# Patient Record
Sex: FEMALE | Race: WHITE | Employment: FULL TIME | URBAN - METROPOLITAN AREA
[De-identification: names, ages, dates, MRNs, and addresses within clinical notes are randomized per-mention and may not be internally consistent; named-entity substitution may affect disease eponyms.]

---

## 2017-11-02 ENCOUNTER — HOSPITAL ENCOUNTER (INPATIENT)
Facility: HOSPITAL | Age: 62
LOS: 2 days | Discharge: HOME | DRG: 293 | End: 2017-11-04
Attending: EMERGENCY MEDICINE | Admitting: FAMILY MEDICINE
Payer: COMMERCIAL

## 2017-11-02 ENCOUNTER — APPOINTMENT (OUTPATIENT)
Dept: GENERAL RADIOLOGY | Facility: HOSPITAL | Age: 62
DRG: 293 | End: 2017-11-02
Attending: EMERGENCY MEDICINE
Payer: COMMERCIAL

## 2017-11-02 DIAGNOSIS — R07.89 OTHER CHEST PAIN: ICD-10-CM

## 2017-11-02 DIAGNOSIS — I10 HYPERTENSION, UNSPECIFIED TYPE: ICD-10-CM

## 2017-11-02 DIAGNOSIS — R73.9 HYPERGLYCEMIA: Primary | ICD-10-CM

## 2017-11-02 DIAGNOSIS — E11.8 TYPE 2 DIABETES MELLITUS WITH COMPLICATION, WITHOUT LONG-TERM CURRENT USE OF INSULIN (HCC): ICD-10-CM

## 2017-11-02 DIAGNOSIS — R60.9 PERIPHERAL EDEMA: ICD-10-CM

## 2017-11-02 PROBLEM — R60.0 PERIPHERAL EDEMA: Status: ACTIVE | Noted: 2017-11-02

## 2017-11-02 PROBLEM — I50.9 CHF EXACERBATION (HCC): Status: ACTIVE | Noted: 2017-11-02

## 2017-11-02 PROCEDURE — 84484 ASSAY OF TROPONIN QUANT: CPT | Performed by: EMERGENCY MEDICINE

## 2017-11-02 PROCEDURE — 82962 GLUCOSE BLOOD TEST: CPT

## 2017-11-02 PROCEDURE — 85025 COMPLETE CBC W/AUTO DIFF WBC: CPT | Performed by: EMERGENCY MEDICINE

## 2017-11-02 PROCEDURE — 99285 EMERGENCY DEPT VISIT HI MDM: CPT

## 2017-11-02 PROCEDURE — 93005 ELECTROCARDIOGRAM TRACING: CPT

## 2017-11-02 PROCEDURE — 96372 THER/PROPH/DIAG INJ SC/IM: CPT

## 2017-11-02 PROCEDURE — 71020 XR CHEST PA + LAT CHEST (CPT=71020): CPT | Performed by: EMERGENCY MEDICINE

## 2017-11-02 PROCEDURE — 80053 COMPREHEN METABOLIC PANEL: CPT | Performed by: EMERGENCY MEDICINE

## 2017-11-02 PROCEDURE — 83880 ASSAY OF NATRIURETIC PEPTIDE: CPT | Performed by: EMERGENCY MEDICINE

## 2017-11-02 PROCEDURE — 93010 ELECTROCARDIOGRAM REPORT: CPT

## 2017-11-02 RX ORDER — ACYCLOVIR 400 MG/1
400 TABLET ORAL 4 TIMES DAILY
COMMUNITY
Start: 2017-10-28 | End: 2017-11-04

## 2017-11-02 RX ORDER — LOSARTAN POTASSIUM 50 MG/1
50 TABLET ORAL DAILY
Status: DISCONTINUED | OUTPATIENT
Start: 2017-11-02 | End: 2017-11-03

## 2017-11-02 RX ORDER — METOPROLOL SUCCINATE 50 MG/1
50 TABLET, EXTENDED RELEASE ORAL
Status: DISCONTINUED | OUTPATIENT
Start: 2017-11-03 | End: 2017-11-03

## 2017-11-02 RX ORDER — INSULIN ASPART 100 [IU]/ML
0.15 INJECTION, SOLUTION INTRAVENOUS; SUBCUTANEOUS ONCE
Status: COMPLETED | OUTPATIENT
Start: 2017-11-02 | End: 2017-11-02

## 2017-11-02 RX ORDER — ZOLPIDEM TARTRATE 5 MG/1
5 TABLET ORAL NIGHTLY PRN
Status: DISCONTINUED | OUTPATIENT
Start: 2017-11-02 | End: 2017-11-04

## 2017-11-02 RX ORDER — METOPROLOL SUCCINATE 25 MG/1
25 TABLET, EXTENDED RELEASE ORAL
Status: DISCONTINUED | OUTPATIENT
Start: 2017-11-03 | End: 2017-11-02

## 2017-11-02 RX ORDER — METOPROLOL SUCCINATE 50 MG/1
50 TABLET, EXTENDED RELEASE ORAL DAILY
COMMUNITY
End: 2017-11-04

## 2017-11-02 RX ORDER — DEXTROSE MONOHYDRATE 25 G/50ML
50 INJECTION, SOLUTION INTRAVENOUS
Status: DISCONTINUED | OUTPATIENT
Start: 2017-11-02 | End: 2017-11-04

## 2017-11-02 RX ORDER — ENOXAPARIN SODIUM 100 MG/ML
40 INJECTION SUBCUTANEOUS DAILY
Status: DISCONTINUED | OUTPATIENT
Start: 2017-11-02 | End: 2017-11-04

## 2017-11-02 NOTE — ED PROVIDER NOTES
Patient Seen in: [facility] Emergency Department    History   Patient presents with:  Hypertension (cardiovascular)    Stated Complaint: htn    HPI    61-year-old female sent to the emergency room by her primary care physician for abnormal labs and Smokeless tobacco: Never Used                      Alcohol use: Yes              Comment: rare      Review of Systems    Positive for stated complaint: htn  Other systems are as noted in HPI.   Constitut Monocyte Absolute 0.61 (*)     All other components within normal limits   TROPONIN I - Normal   CBC WITH DIFFERENTIAL WITH PLATELET    Narrative: The following orders were created for panel order CBC WITH DIFFERENTIAL WITH PLATELET.   Procedure patient is to be admitted for further evaluation of her malignant hypertension, abnormal BNP, peripheral edema. Cardiology consulted. Patient agrees with plan for admission. Glucose elevated at 348 but there is no evidence of DKA. Insulin ordered.   Gissel Melo

## 2017-11-02 NOTE — ED NOTES
Pt is resting abed at this time, no distress noted, skin w/p/d, hypertensive. Updated on POC. Call light remains in reach. Will continue to monitor.

## 2017-11-02 NOTE — ED NOTES
Pt reports previous hx of hypertension - was on lisinopril/hctz. Reports now on beta blocker.      Pt reports blurry vision - states \"I feel like I should have my other glasses on\" - states symptom comes and goes, but does not feel like she has blurry vis

## 2017-11-02 NOTE — ED INITIAL ASSESSMENT (HPI)
seen yesterday for 255/110 bp, similar in office, gave labetolol down to 190's pt wanting to go home, labs were drawn, bnp was 1287, bp still elevated pt called at work to come to er, nonfasting glusose 397, chol 349

## 2017-11-03 ENCOUNTER — APPOINTMENT (OUTPATIENT)
Dept: CV DIAGNOSTICS | Facility: HOSPITAL | Age: 62
DRG: 293 | End: 2017-11-03
Attending: FAMILY MEDICINE
Payer: COMMERCIAL

## 2017-11-03 ENCOUNTER — PRIOR ORIGINAL RECORDS (OUTPATIENT)
Dept: OTHER | Age: 62
End: 2017-11-03

## 2017-11-03 PROCEDURE — 83036 HEMOGLOBIN GLYCOSYLATED A1C: CPT | Performed by: FAMILY MEDICINE

## 2017-11-03 PROCEDURE — 78452 HT MUSCLE IMAGE SPECT MULT: CPT | Performed by: FAMILY MEDICINE

## 2017-11-03 PROCEDURE — 93018 CV STRESS TEST I&R ONLY: CPT | Performed by: FAMILY MEDICINE

## 2017-11-03 PROCEDURE — 93306 TTE W/DOPPLER COMPLETE: CPT | Performed by: FAMILY MEDICINE

## 2017-11-03 PROCEDURE — 82962 GLUCOSE BLOOD TEST: CPT

## 2017-11-03 PROCEDURE — 80061 LIPID PANEL: CPT | Performed by: FAMILY MEDICINE

## 2017-11-03 PROCEDURE — 80053 COMPREHEN METABOLIC PANEL: CPT | Performed by: FAMILY MEDICINE

## 2017-11-03 PROCEDURE — 85025 COMPLETE CBC W/AUTO DIFF WBC: CPT | Performed by: FAMILY MEDICINE

## 2017-11-03 PROCEDURE — 93017 CV STRESS TEST TRACING ONLY: CPT | Performed by: FAMILY MEDICINE

## 2017-11-03 RX ORDER — ASPIRIN 325 MG
325 TABLET, DELAYED RELEASE (ENTERIC COATED) ORAL DAILY
Status: DISCONTINUED | OUTPATIENT
Start: 2017-11-03 | End: 2017-11-03

## 2017-11-03 RX ORDER — CARVEDILOL 3.12 MG/1
6.25 TABLET ORAL 2 TIMES DAILY WITH MEALS
Qty: 60 TABLET | Refills: 11 | Status: SHIPPED | OUTPATIENT
Start: 2017-11-03 | End: 2018-12-12 | Stop reason: DRUGHIGH

## 2017-11-03 RX ORDER — LISINOPRIL 20 MG/1
20 TABLET ORAL DAILY
Qty: 30 TABLET | Refills: 11 | Status: SHIPPED | OUTPATIENT
Start: 2017-11-03 | End: 2018-12-12 | Stop reason: DRUGHIGH

## 2017-11-03 RX ORDER — POTASSIUM CHLORIDE 20 MEQ/1
40 TABLET, EXTENDED RELEASE ORAL ONCE
Status: COMPLETED | OUTPATIENT
Start: 2017-11-03 | End: 2017-11-03

## 2017-11-03 RX ORDER — METOPROLOL TARTRATE 50 MG/1
TABLET, FILM COATED ORAL
Qty: 2 TABLET | Refills: 0 | Status: SHIPPED | OUTPATIENT
Start: 2017-11-03 | End: 2017-11-03

## 2017-11-03 RX ORDER — AMLODIPINE BESYLATE 5 MG/1
5 TABLET ORAL DAILY
Qty: 30 TABLET | Refills: 11 | Status: SHIPPED | OUTPATIENT
Start: 2017-11-04 | End: 2018-12-12 | Stop reason: ALTCHOICE

## 2017-11-03 RX ORDER — ATORVASTATIN CALCIUM 10 MG/1
10 TABLET, FILM COATED ORAL NIGHTLY
Qty: 30 TABLET | Refills: 11 | Status: SHIPPED | OUTPATIENT
Start: 2017-11-03 | End: 2019-12-18

## 2017-11-03 RX ORDER — ATORVASTATIN CALCIUM 10 MG/1
10 TABLET, FILM COATED ORAL NIGHTLY
Status: DISCONTINUED | OUTPATIENT
Start: 2017-11-03 | End: 2017-11-04

## 2017-11-03 RX ORDER — AMLODIPINE BESYLATE 5 MG/1
5 TABLET ORAL DAILY
Status: DISCONTINUED | OUTPATIENT
Start: 2017-11-03 | End: 2017-11-04

## 2017-11-03 RX ORDER — LISINOPRIL 20 MG/1
20 TABLET ORAL DAILY
Status: DISCONTINUED | OUTPATIENT
Start: 2017-11-03 | End: 2017-11-04

## 2017-11-03 RX ORDER — METOPROLOL TARTRATE 50 MG/1
50 TABLET, FILM COATED ORAL AS NEEDED
Status: DISCONTINUED | OUTPATIENT
Start: 2017-11-03 | End: 2017-11-04

## 2017-11-03 RX ORDER — CARVEDILOL 3.12 MG/1
3.12 TABLET ORAL 2 TIMES DAILY WITH MEALS
Status: DISCONTINUED | OUTPATIENT
Start: 2017-11-03 | End: 2017-11-04

## 2017-11-03 RX ORDER — TRIAMTERENE AND HYDROCHLOROTHIAZIDE 37.5; 25 MG/1; MG/1
1 CAPSULE ORAL DAILY
Status: DISCONTINUED | OUTPATIENT
Start: 2017-11-03 | End: 2017-11-03

## 2017-11-03 RX ORDER — ASPIRIN 81 MG/1
81 TABLET ORAL DAILY
Qty: 30 TABLET | Refills: 11 | Status: SHIPPED | OUTPATIENT
Start: 2017-11-03 | End: 2019-06-17 | Stop reason: ALTCHOICE

## 2017-11-03 RX ORDER — ASPIRIN 81 MG/1
81 TABLET ORAL DAILY
Status: DISCONTINUED | OUTPATIENT
Start: 2017-11-03 | End: 2017-11-04

## 2017-11-03 NOTE — CONSULTS
Cardiology Consultation    Artur Rinaldi Patient Status:  Observation    8/10/1955 MRN [id number]   [facility] 7NE-A Attending Darcy Bell MD   Hosp Day # 0 PCP Deepak Drew MD     Reason for Consultation:  Hypertensive urgency (1.626 m)   Wt 155 lb (70.3 kg)   SpO2 100%   BMI 26.61 kg/m²   Temp (24hrs), Av.5 °F (36.9 °C), Min:98.4 °F (36.9 °C), Max:98.6 °F (37 °C)     No intake or output data in the 24 hours ending 17 2212  Wt Readings from Last 3 Encounters:  17

## 2017-11-03 NOTE — PROGRESS NOTES
[facility] Cardiology Progress Note    Patient seen and examined.  Chart reviewed.  Discussed with family at bedside. No chest pain or shortness of breath.     BP (!) 172/77 (BP Location: Right arm)   Pulse 71   Temp 98.6 °F (37 °C) (Oral)   Resp 16   Ht 5' 4\"
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
## 2017-11-03 NOTE — PROGRESS NOTES
Cardiac Diagnostics preliminary Lexiscan nuclear stress test:  Patient denied cardiac symptoms, and no ST changes noted on EKG throughout test.  Patient resting in recliner awaiting stress images and denies need of anything.     Addendum:    Nuclear images

## 2017-11-03 NOTE — H&P
[address] Patient Status:  Inpatient    8/10/1955 MRN [id number]   [facility] 7NE-A Attending Oneida Ulrich MD   Hosp Day # 1 PCP Fantasma Soni MD     History of Present Illness:  Miki Knutson Other      age at dx 43's      reports that she has never smoked. She has never used smokeless tobacco. She reports that she drinks alcohol. She reports that she does not use drugs.     Allergies:    Codeine [Opioid Sue*    [facility] stool   Extremities:   Extremities normal, atraumatic, no cyanosis or edema   Pulses:   2+ and symmetric all extremities   Skin:   Skin color, texture, turgor normal, no rashes or lesions   Lymph nodes:   Cervical, supraclavicular, and axillary nodes teresa

## 2017-11-03 NOTE — PLAN OF CARE
Assumed care at 2030. Alert and oriented x4. Telemetry monitor reading SR. Up ad anjelica. RADHA hose at bedside, patient will put on in the am. Patient refused Lovenox earlier in shift, but stated later that she would start taking the next dose offered.  Assessme

## 2017-11-03 NOTE — PLAN OF CARE
Received patient via cart from the ED with no c/o chest pain or SOB. Alert and oriented x4. Telemetry monitor reading SR. Oriented to floor and hospital policies. See admission assessment for complete charting.  Call light in reach at the bedside and bed in

## 2017-11-04 VITALS
WEIGHT: 176.81 LBS | DIASTOLIC BLOOD PRESSURE: 74 MMHG | OXYGEN SATURATION: 99 % | HEIGHT: 64 IN | RESPIRATION RATE: 18 BRPM | TEMPERATURE: 98 F | BODY MASS INDEX: 30.19 KG/M2 | SYSTOLIC BLOOD PRESSURE: 121 MMHG | HEART RATE: 74 BPM

## 2017-11-04 PROCEDURE — 84132 ASSAY OF SERUM POTASSIUM: CPT | Performed by: FAMILY MEDICINE

## 2017-11-04 PROCEDURE — 82962 GLUCOSE BLOOD TEST: CPT

## 2017-11-04 NOTE — PLAN OF CARE
Jorje Reece was hospitalized from 11/2/17-11/4/17. She may return to work on Wednesday 11/8/17 per Dr. Manjit Singh order.

## 2017-11-04 NOTE — PLAN OF CARE
Pt A/Ox4, on RA, NSR per tele, denies any pain  Pt up and ambulating ad anjelica  Stress test and Echo completed  PLAN: stay overnight for BP and blood sugars  Will cont to monitor    CARDIOVASCULAR - ADULT    • Maintains optimal cardiac output and hemodynamic

## 2017-11-04 NOTE — PLAN OF CARE
NURSING DISCHARGE NOTE    Discharged to home. Accompanied by staff. Belongings sent. DC instructions given, prescriptions given, pt verbalizes understanding. IV-HL dc'd.

## 2017-11-04 NOTE — PLAN OF CARE
Assumed care at [address]. Alert and oriented x4. Telemetry monitor reading SR. Assessment remains unchanged from the beginning of shift. Call light in reach at the bedside. Will continue to monitor.     CARDIOVASCULAR - ADULT    • Maintains optimal cardiac outp

## 2017-11-05 NOTE — PROGRESS NOTES
[facility]  Progress Note    Kay De Souza Patient Status:  Inpatient    8/10/1955 MRN [id number]   [facility] 7NE-A Attending No att. providers found   [facility] Day # 2 PCP Pardeep Mendoza MD       SUBJECTIVE:  No CP, SOB, or N/V.   Feels

## 2017-11-13 ENCOUNTER — DIABETIC EDUCATION (OUTPATIENT)
Dept: ENDOCRINOLOGY CLINIC | Facility: CLINIC | Age: 62
End: 2017-11-13

## 2017-11-13 VITALS — BODY MASS INDEX: 30 KG/M2 | WEIGHT: 174.38 LBS

## 2017-11-13 DIAGNOSIS — E11.65 TYPE 2 DIABETES MELLITUS WITH HYPERGLYCEMIA, WITHOUT LONG-TERM CURRENT USE OF INSULIN (HCC): Primary | ICD-10-CM

## 2017-11-13 PROCEDURE — G0108 DIAB MANAGE TRN  PER INDIV: HCPCS | Performed by: DIETITIAN, REGISTERED

## 2017-11-21 ENCOUNTER — DIABETIC EDUCATION (OUTPATIENT)
Dept: ENDOCRINOLOGY CLINIC | Facility: CLINIC | Age: 62
End: 2017-11-21

## 2017-11-21 DIAGNOSIS — E11.65 TYPE 2 DIABETES MELLITUS WITH HYPERGLYCEMIA, WITHOUT LONG-TERM CURRENT USE OF INSULIN (HCC): Primary | ICD-10-CM

## 2017-11-21 PROCEDURE — G0109 DIAB MANAGE TRN IND/GROUP: HCPCS | Performed by: DIETITIAN, REGISTERED

## 2017-11-22 ENCOUNTER — PRIOR ORIGINAL RECORDS (OUTPATIENT)
Dept: OTHER | Age: 62
End: 2017-11-22

## 2017-11-22 NOTE — PROGRESS NOTES
Kristin Pavon  [id number] attended Step 2 Diabetic Education:    Date: 11/21/2017  Start time: 6 pm End time: 8 pm      Diabetes Overview, pathophysiology, pre-diabetes, A1C results and treatment options for diabetes self-management, types of diabetes an

## 2017-12-05 ENCOUNTER — PRIOR ORIGINAL RECORDS (OUTPATIENT)
Dept: OTHER | Age: 62
End: 2017-12-05

## 2017-12-05 ENCOUNTER — HOSPITAL ENCOUNTER (OUTPATIENT)
Dept: CT IMAGING | Facility: HOSPITAL | Age: 62
Discharge: HOME | End: 2017-12-05
Attending: INTERNAL MEDICINE
Payer: COMMERCIAL

## 2017-12-05 VITALS
RESPIRATION RATE: 16 BRPM | SYSTOLIC BLOOD PRESSURE: 164 MMHG | HEART RATE: 63 BPM | OXYGEN SATURATION: 98 % | DIASTOLIC BLOOD PRESSURE: 80 MMHG

## 2017-12-05 DIAGNOSIS — I10 HYPERTENSION, UNSPECIFIED TYPE: ICD-10-CM

## 2017-12-05 DIAGNOSIS — R07.89 OTHER CHEST PAIN: ICD-10-CM

## 2017-12-05 PROCEDURE — 75574 CT ANGIO HRT W/3D IMAGE: CPT | Performed by: INTERNAL MEDICINE

## 2017-12-05 RX ORDER — METOPROLOL TARTRATE 5 MG/5ML
INJECTION INTRAVENOUS
Status: COMPLETED
Start: 2017-12-05 | End: 2017-12-05

## 2017-12-05 RX ORDER — NITROGLYCERIN 0.4 MG/1
TABLET SUBLINGUAL
Status: COMPLETED
Start: 2017-12-05 | End: 2017-12-05

## 2017-12-05 RX ADMIN — METOPROLOL TARTRATE: 5 INJECTION INTRAVENOUS at 13:43:00

## 2017-12-05 RX ADMIN — NITROGLYCERIN 0.4 MG: 0.4 TABLET SUBLINGUAL at 13:55:00

## 2017-12-07 ENCOUNTER — PRIOR ORIGINAL RECORDS (OUTPATIENT)
Dept: OTHER | Age: 62
End: 2017-12-07

## 2017-12-07 LAB — UFCT: 0 CA SCORE

## 2017-12-08 ENCOUNTER — HOSPITAL ENCOUNTER (OUTPATIENT)
Dept: CARDIOLOGY CLINIC | Facility: HOSPITAL | Age: 62
Discharge: HOME | End: 2017-12-08
Attending: INTERNAL MEDICINE

## 2017-12-08 ENCOUNTER — PRIOR ORIGINAL RECORDS (OUTPATIENT)
Dept: OTHER | Age: 62
End: 2017-12-08

## 2017-12-08 ENCOUNTER — MYAURORA ACCOUNT LINK (OUTPATIENT)
Dept: OTHER | Age: 62
End: 2017-12-08

## 2017-12-08 DIAGNOSIS — I10 PRIMARY HYPERTENSION: ICD-10-CM

## 2017-12-12 ENCOUNTER — DIABETIC EDUCATION (OUTPATIENT)
Dept: ENDOCRINOLOGY CLINIC | Facility: CLINIC | Age: 62
End: 2017-12-12

## 2017-12-12 DIAGNOSIS — E11.65 TYPE 2 DIABETES MELLITUS WITH HYPERGLYCEMIA, WITHOUT LONG-TERM CURRENT USE OF INSULIN (HCC): Primary | ICD-10-CM

## 2017-12-12 PROCEDURE — G0109 DIAB MANAGE TRN IND/GROUP: HCPCS | Performed by: DIETITIAN, REGISTERED

## 2017-12-13 NOTE — PROGRESS NOTES
Chanel Weaver  [id number] attended Step 3 Diabetic Education:    Date: 12/13/2017  Start time: 6:00 End time: 8:00          Prevention, detection and treatment of chronic complications  Reviewed how to reduce risks of complications including eye, lynsey

## 2017-12-14 ENCOUNTER — PRIOR ORIGINAL RECORDS (OUTPATIENT)
Dept: OTHER | Age: 62
End: 2017-12-14

## 2017-12-14 LAB
ALBUMIN: 2.4 G/DL
ALKALINE PHOSPHATATE(ALK PHOS): 59 IU/L
BILIRUBIN TOTAL: 0.4 MG/DL
BUN: 26 MG/DL
CALCIUM: 8.7 MG/DL
CHLORIDE: 104 MEQ/L
CHOLESTEROL, TOTAL: 255 MG/DL
CREATININE, SERUM: 0.94 MG/DL
GLUCOSE: 183 MG/DL
HDL CHOLESTEROL: 54 MG/DL
HEMATOCRIT: 35.1 %
HEMOGLOBIN A1C: 14 %
HEMOGLOBIN: 12 G/DL
LDL CHOLESTEROL: 161 MG/DL
PLATELETS: 199 K/UL
POTASSIUM, SERUM: 3.7 MEQ/L
PROTEIN, TOTAL: 5.9 G/DL
RED BLOOD COUNT: 3.98 X 10-6/U
SGOT (AST): 14 IU/L
SGPT (ALT): 17 IU/L
SODIUM: 137 MEQ/L
TRIGLYCERIDES: 198 MG/DL
WHITE BLOOD COUNT: 7.8 X 10-3/U

## 2017-12-19 ENCOUNTER — PRIOR ORIGINAL RECORDS (OUTPATIENT)
Dept: OTHER | Age: 62
End: 2017-12-19

## 2017-12-27 ENCOUNTER — PRIOR ORIGINAL RECORDS (OUTPATIENT)
Dept: OTHER | Age: 62
End: 2017-12-27

## 2018-01-03 ENCOUNTER — HOSPITAL ENCOUNTER (OUTPATIENT)
Dept: CT IMAGING | Facility: HOSPITAL | Age: 63
Discharge: HOME | End: 2018-01-03
Attending: INTERNAL MEDICINE
Payer: COMMERCIAL

## 2018-01-03 ENCOUNTER — APPOINTMENT (OUTPATIENT)
Dept: LAB | Facility: HOSPITAL | Age: 63
End: 2018-01-03
Attending: INTERNAL MEDICINE
Payer: COMMERCIAL

## 2018-01-03 DIAGNOSIS — I70.1 RENAL ARTERY STENOSIS (HCC): ICD-10-CM

## 2018-01-03 LAB — CREAT SERPL-MCNC: 0.9 MG/DL (ref 0.55–1.02)

## 2018-01-03 PROCEDURE — 74174 CTA ABD&PLVS W/CONTRAST: CPT | Performed by: INTERNAL MEDICINE

## 2018-01-03 PROCEDURE — 82565 ASSAY OF CREATININE: CPT

## 2018-01-04 ENCOUNTER — PRIOR ORIGINAL RECORDS (OUTPATIENT)
Dept: OTHER | Age: 63
End: 2018-01-04

## 2018-01-10 ENCOUNTER — PRIOR ORIGINAL RECORDS (OUTPATIENT)
Dept: OTHER | Age: 63
End: 2018-01-10

## 2018-02-06 ENCOUNTER — PRIOR ORIGINAL RECORDS (OUTPATIENT)
Dept: OTHER | Age: 63
End: 2018-02-06

## 2018-02-28 ENCOUNTER — PRIOR ORIGINAL RECORDS (OUTPATIENT)
Dept: OTHER | Age: 63
End: 2018-02-28

## 2018-03-05 LAB
ALBUMIN: 3.8 G/DL
ALKALINE PHOSPHATATE(ALK PHOS): 57 IU/L
BILIRUBIN TOTAL: 0.3 MG/DL
BUN: 34 MG/DL
CALCIUM: 9.4 MG/DL
CHLORIDE: 101 MEQ/L
CHOLESTEROL, TOTAL: 160 MG/DL
CREATININE, SERUM: 1.1 MG/DL
ESR (SED RATE): 54 MM/HR
FREE T4: 1.45 MG/DL
GLOBULIN: 2.9 G/DL
GLUCOSE: 165 MG/DL
HDL CHOLESTEROL: 55 MG/DL
HEMATOCRIT: 33 %
HEMOGLOBIN: 11.1 G/DL
HSCRP(TYPE Y): 3.11 YES
IRON, TOTAL: 81 MCG/DL
LDL CHOLESTEROL: 81 MG/DL
PLATELETS: 269 K/UL
POTASSIUM, SERUM: 4.9 MEQ/L
PROTEIN, TOTAL: 6.7 G/DL
RED BLOOD COUNT: 3.7 X 10-6/U
SGOT (AST): 13 IU/L
SGPT (ALT): 14 IU/L
SODIUM: 139 MEQ/L
THYROID STIMULATING HORMONE: 3.04 MLU/L
THYROID STIMULATING HORMONE: 3.04 MLU/L
TRIGLYCERIDES: 118 MG/DL
VITAMIN D 25-OH: 29.1 NG/ML
WHITE BLOOD COUNT: 7.8 X 10-3/U

## 2018-05-03 ENCOUNTER — PRIOR ORIGINAL RECORDS (OUTPATIENT)
Dept: OTHER | Age: 63
End: 2018-05-03

## 2018-06-13 ENCOUNTER — PRIOR ORIGINAL RECORDS (OUTPATIENT)
Dept: OTHER | Age: 63
End: 2018-06-13

## 2018-06-26 ENCOUNTER — HOSPITAL ENCOUNTER (OUTPATIENT)
Dept: CT IMAGING | Facility: HOSPITAL | Age: 63
Discharge: HOME | End: 2018-06-26
Attending: INTERNAL MEDICINE
Payer: COMMERCIAL

## 2018-06-26 DIAGNOSIS — I10 ESSENTIAL HYPERTENSION: ICD-10-CM

## 2018-06-26 PROCEDURE — 82565 ASSAY OF CREATININE: CPT

## 2018-06-26 PROCEDURE — 74178 CT ABD&PLV WO CNTR FLWD CNTR: CPT | Performed by: INTERNAL MEDICINE

## 2018-06-28 LAB
ALBUMIN: 4.1 G/DL
ALT (SGPT): 11 U/L
ALT (SGPT): 11 U/L
AST (SGOT): 15 U/L
AST (SGOT): 15 U/L
BUN: 42 MG/DL
CHLORIDE: 102 MEQ/L
CHOLESTEROL, TOTAL: 150 MG/DL
CREATININE, SERUM: 1.1 MG/DL
GLUCOSE: 173 MG/DL
HDL CHOLESTEROL: 57 MG/DL
HEMATOCRIT: 32.2 %
HEMOGLOBIN A1C: 6.4 %
HEMOGLOBIN: 10.5 G/DL
LDL CHOLESTEROL: 79 MG/DL
PLATELETS: 192 K/UL
POTASSIUM, SERUM: 4.8 MEQ/L
RED BLOOD COUNT: 3.6 X 10-6/U
SGOT (AST): 15 IU/L
SGPT (ALT): 11 IU/L
SODIUM: 139 MEQ/L
THYROID STIMULATING HORMONE: 3.75 MLU/L
TRIGLYCERIDES: 68 MG/DL
VITAMIN D 25-OH: 56.8 NG/ML
WHITE BLOOD COUNT: 7.9 X 10-3/U

## 2018-09-12 ENCOUNTER — PRIOR ORIGINAL RECORDS (OUTPATIENT)
Dept: OTHER | Age: 63
End: 2018-09-12

## 2018-09-12 ENCOUNTER — MYAURORA ACCOUNT LINK (OUTPATIENT)
Dept: OTHER | Age: 63
End: 2018-09-12

## 2018-09-14 ENCOUNTER — PRIOR ORIGINAL RECORDS (OUTPATIENT)
Dept: OTHER | Age: 63
End: 2018-09-14

## 2018-09-17 ENCOUNTER — PRIOR ORIGINAL RECORDS (OUTPATIENT)
Dept: OTHER | Age: 63
End: 2018-09-17

## 2018-12-06 ENCOUNTER — HOSPITAL ENCOUNTER (OUTPATIENT)
Dept: CV DIAGNOSTICS | Facility: HOSPITAL | Age: 63
Discharge: HOME | End: 2018-12-06
Attending: FAMILY MEDICINE
Payer: COMMERCIAL

## 2018-12-06 DIAGNOSIS — R60.1 GENERALIZED EDEMA: ICD-10-CM

## 2018-12-06 PROCEDURE — 93306 TTE W/DOPPLER COMPLETE: CPT | Performed by: FAMILY MEDICINE

## 2018-12-10 ENCOUNTER — HOSPITAL ENCOUNTER (OUTPATIENT)
Dept: NUCLEAR MEDICINE | Facility: HOSPITAL | Age: 63
Discharge: HOME | End: 2018-12-10
Attending: FAMILY MEDICINE
Payer: COMMERCIAL

## 2018-12-10 DIAGNOSIS — R60.9 PERIPHERAL EDEMA: ICD-10-CM

## 2018-12-10 DIAGNOSIS — R06.02 SHORTNESS OF BREATH: ICD-10-CM

## 2018-12-10 PROCEDURE — 78582 LUNG VENTILAT&PERFUS IMAGING: CPT | Performed by: FAMILY MEDICINE

## 2018-12-12 ENCOUNTER — OFFICE VISIT (OUTPATIENT)
Dept: NEPHROLOGY | Facility: CLINIC | Age: 63
End: 2018-12-12
Payer: COMMERCIAL

## 2018-12-12 VITALS — BODY MASS INDEX: 30 KG/M2 | DIASTOLIC BLOOD PRESSURE: 74 MMHG | WEIGHT: 174 LBS | SYSTOLIC BLOOD PRESSURE: 168 MMHG

## 2018-12-12 DIAGNOSIS — N18.30 CKD (CHRONIC KIDNEY DISEASE) STAGE 3, GFR 30-59 ML/MIN (HCC): Primary | ICD-10-CM

## 2018-12-12 DIAGNOSIS — R80.9 PROTEINURIA, UNSPECIFIED TYPE: ICD-10-CM

## 2018-12-12 DIAGNOSIS — I10 ESSENTIAL HYPERTENSION: ICD-10-CM

## 2018-12-12 PROCEDURE — 99244 OFF/OP CNSLTJ NEW/EST MOD 40: CPT | Performed by: INTERNAL MEDICINE

## 2018-12-12 RX ORDER — ERGOCALCIFEROL (VITAMIN D2) 1250 MCG
50000 CAPSULE ORAL WEEKLY
COMMUNITY
End: 2020-03-30

## 2018-12-12 RX ORDER — GLIPIZIDE 5 MG/1
5 TABLET, FILM COATED, EXTENDED RELEASE ORAL
COMMUNITY
End: 2019-11-18 | Stop reason: DRUGHIGH

## 2018-12-12 RX ORDER — LISINOPRIL 20 MG/1
20 TABLET ORAL 2 TIMES DAILY
COMMUNITY
End: 2019-06-17 | Stop reason: ALTCHOICE

## 2018-12-12 RX ORDER — TORSEMIDE 10 MG/1
10 TABLET ORAL DAILY
COMMUNITY
End: 2019-11-18 | Stop reason: DRUGHIGH

## 2018-12-12 RX ORDER — CARVEDILOL 25 MG/1
25 TABLET ORAL 2 TIMES DAILY WITH MEALS
COMMUNITY
End: 2019-11-18 | Stop reason: ALTCHOICE

## 2018-12-12 NOTE — PROGRESS NOTES
Nephrology Consult Note    REASON FOR CONSULT: CKD 3 / edema / HTN    ASSESSMENT/PLAN:        1) CKD 3- agree this is due to a combination of long-standing, previously uncontrolled type 2 DM (A1C > 12 in 2010 AFTER 100# weight loss) which is also lead to r should improve with discontinuation of amlodipine. Continue torsemide 20 mg qd.           HPI:   Chanel Weaver is a 59year old female with Patient presents with:  Hypertension  Other: elevated creatinine    Gucci Gramajo MD    Presents for [facility] mouth daily. Disp:  Rfl:    GlipiZIDE ER 2.5 MG Oral Tablet 24 Hr Take 2.5 mg by mouth daily with breakfast. Disp:  Rfl:    ergocalciferol 20581 units Oral Cap Take 50,000 Units by mouth once a week.  Disp:  Rfl:    atorvastatin 10 MG Oral Tab Take 1 tablet Supple, no SOM or thyromegaly  Cardiac: Regular rate and rhythm, S1, S2 normal, no murmur or rub  Lungs: Clear without wheezes, rales, rhonchi.     Abdomen: Soft, non-tender. + bowel sounds, no palpable organomegaly  Extremities: Without clubbing, cyanosis;

## 2018-12-13 ENCOUNTER — PRIOR ORIGINAL RECORDS (OUTPATIENT)
Dept: OTHER | Age: 63
End: 2018-12-13

## 2018-12-13 ENCOUNTER — ORDER TRANSCRIPTION (OUTPATIENT)
Dept: ADMINISTRATIVE | Facility: HOSPITAL | Age: 63
End: 2018-12-13

## 2018-12-13 DIAGNOSIS — R06.02 SOB (SHORTNESS OF BREATH): Primary | ICD-10-CM

## 2018-12-17 ENCOUNTER — RT VISIT (OUTPATIENT)
Dept: RESPIRATORY THERAPY | Facility: HOSPITAL | Age: 63
End: 2018-12-17
Attending: FAMILY MEDICINE
Payer: COMMERCIAL

## 2018-12-17 DIAGNOSIS — R06.02 SOB (SHORTNESS OF BREATH): ICD-10-CM

## 2018-12-17 PROCEDURE — 94729 DIFFUSING CAPACITY: CPT

## 2018-12-17 PROCEDURE — 94726 PLETHYSMOGRAPHY LUNG VOLUMES: CPT

## 2018-12-17 PROCEDURE — 94010 BREATHING CAPACITY TEST: CPT

## 2018-12-17 NOTE — PROCEDURES
Betsy Edmondson is a 71-year-old  female who stands 5 feet 5 inches tall and weighs 174 pounds. She underwent standard pulmonary function testing on 12/17/18. She carries a diagnosis of shortness of breath. No smoking history is recorded.   Results

## 2018-12-19 ENCOUNTER — MYAURORA ACCOUNT LINK (OUTPATIENT)
Dept: OTHER | Age: 63
End: 2018-12-19

## 2018-12-19 ENCOUNTER — PRIOR ORIGINAL RECORDS (OUTPATIENT)
Dept: OTHER | Age: 63
End: 2018-12-19

## 2018-12-26 ENCOUNTER — PRIOR ORIGINAL RECORDS (OUTPATIENT)
Dept: OTHER | Age: 63
End: 2018-12-26

## 2019-01-14 ENCOUNTER — PRIOR ORIGINAL RECORDS (OUTPATIENT)
Dept: OTHER | Age: 64
End: 2019-01-14

## 2019-01-16 ENCOUNTER — PRIOR ORIGINAL RECORDS (OUTPATIENT)
Dept: OTHER | Age: 64
End: 2019-01-16

## 2019-02-28 VITALS
HEIGHT: 65 IN | HEART RATE: 72 BPM | OXYGEN SATURATION: 99 % | SYSTOLIC BLOOD PRESSURE: 144 MMHG | WEIGHT: 175 LBS | DIASTOLIC BLOOD PRESSURE: 66 MMHG | BODY MASS INDEX: 29.16 KG/M2

## 2019-02-28 VITALS
HEIGHT: 65 IN | WEIGHT: 186 LBS | SYSTOLIC BLOOD PRESSURE: 148 MMHG | HEART RATE: 72 BPM | DIASTOLIC BLOOD PRESSURE: 76 MMHG | BODY MASS INDEX: 30.99 KG/M2

## 2019-02-28 VITALS
WEIGHT: 181 LBS | DIASTOLIC BLOOD PRESSURE: 66 MMHG | BODY MASS INDEX: 30.16 KG/M2 | SYSTOLIC BLOOD PRESSURE: 160 MMHG | HEART RATE: 68 BPM | HEIGHT: 65 IN

## 2019-02-28 VITALS
BODY MASS INDEX: 28.66 KG/M2 | HEIGHT: 65 IN | WEIGHT: 172 LBS | DIASTOLIC BLOOD PRESSURE: 80 MMHG | SYSTOLIC BLOOD PRESSURE: 162 MMHG | HEART RATE: 76 BPM

## 2019-02-28 VITALS
WEIGHT: 173 LBS | HEART RATE: 68 BPM | SYSTOLIC BLOOD PRESSURE: 134 MMHG | BODY MASS INDEX: 28.82 KG/M2 | HEIGHT: 65 IN | DIASTOLIC BLOOD PRESSURE: 70 MMHG

## 2019-02-28 VITALS
HEIGHT: 65 IN | SYSTOLIC BLOOD PRESSURE: 160 MMHG | BODY MASS INDEX: 29.32 KG/M2 | DIASTOLIC BLOOD PRESSURE: 98 MMHG | HEART RATE: 62 BPM | WEIGHT: 176 LBS

## 2019-04-03 RX ORDER — LISINOPRIL 20 MG/1
TABLET ORAL
COMMUNITY
Start: 2018-11-29 | End: 2019-05-28 | Stop reason: SDUPTHER

## 2019-04-03 RX ORDER — GLIPIZIDE 2.5 MG/1
TABLET, EXTENDED RELEASE ORAL
COMMUNITY
Start: 2018-06-13

## 2019-04-03 RX ORDER — TORSEMIDE 20 MG/1
TABLET ORAL
COMMUNITY
Start: 2018-12-19

## 2019-04-03 RX ORDER — ATORVASTATIN CALCIUM 10 MG/1
TABLET, FILM COATED ORAL
COMMUNITY
Start: 2019-02-11

## 2019-04-03 RX ORDER — CARVEDILOL 25 MG/1
50 TABLET ORAL 2 TIMES DAILY WITH MEALS
COMMUNITY
Start: 2018-09-12

## 2019-05-28 RX ORDER — LISINOPRIL 20 MG/1
20 TABLET ORAL 2 TIMES DAILY
Qty: 180 TABLET | Refills: 2 | Status: SHIPPED | OUTPATIENT
Start: 2019-05-28 | End: 2021-01-29

## 2019-06-17 ENCOUNTER — OFFICE VISIT (OUTPATIENT)
Dept: NEPHROLOGY | Facility: CLINIC | Age: 64
End: 2019-06-17
Payer: COMMERCIAL

## 2019-06-17 VITALS — BODY MASS INDEX: 33 KG/M2 | DIASTOLIC BLOOD PRESSURE: 88 MMHG | SYSTOLIC BLOOD PRESSURE: 166 MMHG | WEIGHT: 193 LBS

## 2019-06-17 DIAGNOSIS — I10 ESSENTIAL HYPERTENSION: ICD-10-CM

## 2019-06-17 DIAGNOSIS — N04.9 NEPHROTIC SYNDROME: ICD-10-CM

## 2019-06-17 DIAGNOSIS — N18.30 CKD (CHRONIC KIDNEY DISEASE) STAGE 3, GFR 30-59 ML/MIN (HCC): Primary | ICD-10-CM

## 2019-06-17 PROCEDURE — 99214 OFFICE O/P EST MOD 30 MIN: CPT | Performed by: INTERNAL MEDICINE

## 2019-06-17 RX ORDER — HYDRALAZINE HYDROCHLORIDE 50 MG/1
50 TABLET, FILM COATED ORAL 3 TIMES DAILY
COMMUNITY
End: 2021-01-11

## 2019-06-17 RX ORDER — CLONIDINE HYDROCHLORIDE 0.2 MG/1
0.2 TABLET ORAL 4 TIMES DAILY
COMMUNITY
End: 2019-11-18 | Stop reason: DRUGHIGH

## 2019-06-17 NOTE — PROGRESS NOTES
Nephrology Progress Note      ASSESSMENT/PLAN:        1) CKD 3- due to long-standing, previously uncontrolled diabetes and hypertension due to noncompliance; recently, serum creatinine approximately 2.0 mg/dL which increased to 2.6 mg/dL while hospitalized EGD and colonoscopy that was scheduled as an outpatient. Was hospitalized for 3 days at [facility]; details above.     HISTORY:  Past Medical History:   Diagnosis Date   • Essential hypertension    • HYPERLIPIDEMIA    • HYPERTENSION    • OBESITY    • OTHER DISEAS edema  Denies skin rashes/myalgias/arthralgias      PHYSICAL EXAM:   BP (!) 166/88   Wt 193 lb   BMI 33.13 kg/m²   Wt Readings from Last 3 Encounters:  06/17/19 : 193 lb  12/12/18 : 174 lb  11/13/17 : 174 lb 6.4 oz    General: Alert and oriented in no appa

## 2019-07-03 ENCOUNTER — APPOINTMENT (OUTPATIENT)
Dept: CARDIOLOGY | Age: 64
End: 2019-07-03

## 2020-01-03 PROBLEM — E11.8 CONTROLLED TYPE 2 DIABETES MELLITUS WITH COMPLICATION, WITHOUT LONG-TERM CURRENT USE OF INSULIN (HCC): Status: ACTIVE | Noted: 2020-01-03

## 2020-01-17 PROBLEM — I10 RESISTANT HYPERTENSION: Status: ACTIVE | Noted: 2017-11-02

## 2020-01-17 PROBLEM — R07.2 PRECORDIAL CHEST PAIN: Status: ACTIVE | Noted: 2020-01-17

## 2020-01-17 PROBLEM — I11.0 HYPERTENSIVE HEART DISEASE WITH CHRONIC DIASTOLIC CONGESTIVE HEART FAILURE (HCC): Status: ACTIVE | Noted: 2020-01-17

## 2020-01-17 PROBLEM — I50.32 HYPERTENSIVE HEART DISEASE WITH CHRONIC DIASTOLIC CONGESTIVE HEART FAILURE (HCC): Status: ACTIVE | Noted: 2020-01-17

## 2020-01-17 PROBLEM — I35.0 AORTIC STENOSIS, MILD: Status: ACTIVE | Noted: 2020-01-17

## 2020-01-17 PROBLEM — E78.2 MIXED HYPERLIPIDEMIA DUE TO TYPE 2 DIABETES MELLITUS (HCC): Status: ACTIVE | Noted: 2020-01-17

## 2020-01-17 PROBLEM — N18.4 TYPE 2 DM WITH CKD STAGE 4 AND HYPERTENSION (HCC): Status: ACTIVE | Noted: 2020-01-03

## 2020-01-17 PROBLEM — I12.9 TYPE 2 DM WITH CKD STAGE 4 AND HYPERTENSION (HCC): Status: ACTIVE | Noted: 2020-01-03

## 2020-01-17 PROBLEM — E11.22 TYPE 2 DM WITH CKD STAGE 4 AND HYPERTENSION (HCC): Status: ACTIVE | Noted: 2020-01-03

## 2020-01-17 PROBLEM — I1A.0 RESISTANT HYPERTENSION: Status: ACTIVE | Noted: 2017-11-02

## 2020-01-17 PROBLEM — E11.69 MIXED HYPERLIPIDEMIA DUE TO TYPE 2 DIABETES MELLITUS (HCC): Status: ACTIVE | Noted: 2020-01-17

## 2020-01-17 PROBLEM — E11.69 MIXED HYPERLIPIDEMIA DUE TO TYPE 2 DIABETES MELLITUS: Status: ACTIVE | Noted: 2020-01-17

## 2020-01-17 PROBLEM — E78.2 MIXED HYPERLIPIDEMIA DUE TO TYPE 2 DIABETES MELLITUS: Status: ACTIVE | Noted: 2020-01-17

## 2020-01-27 ENCOUNTER — LAB REQUISITION (OUTPATIENT)
Dept: LAB | Facility: HOSPITAL | Age: 65
End: 2020-01-27
Payer: COMMERCIAL

## 2020-01-27 DIAGNOSIS — H44.022: ICD-10-CM

## 2020-01-27 PROCEDURE — 87206 SMEAR FLUORESCENT/ACID STAI: CPT | Performed by: OPHTHALMOLOGY

## 2020-01-27 PROCEDURE — 87102 FUNGUS ISOLATION CULTURE: CPT | Performed by: OPHTHALMOLOGY

## 2020-01-27 PROCEDURE — 87205 SMEAR GRAM STAIN: CPT | Performed by: OPHTHALMOLOGY

## 2020-01-27 PROCEDURE — 87070 CULTURE OTHR SPECIMN AEROBIC: CPT | Performed by: OPHTHALMOLOGY

## 2020-02-12 ENCOUNTER — HOSPITAL ENCOUNTER (OUTPATIENT)
Dept: NUCLEAR MEDICINE | Age: 65
Discharge: STILL ADMITTED OR ADMINISTRATIVE | End: 2020-02-12
Attending: INTERNAL MEDICINE

## 2020-02-12 PROCEDURE — A9538 TC99M PYROPHOSPHATE: HCPCS | Performed by: INTERNAL MEDICINE

## 2020-02-12 PROCEDURE — 10006150 HB RX 343: Performed by: INTERNAL MEDICINE

## 2020-02-12 RX ORDER — TECHNETIUM TC99M PYROPHOSPHATE 12 MG/10ML
20 INJECTION INTRAVENOUS ONCE
Status: COMPLETED | OUTPATIENT
Start: 2020-02-12 | End: 2020-02-12

## 2020-02-12 RX ADMIN — TECHNETIUM TC99M PYROPHOSPHATE 20 MILLICURIE: 12 INJECTION INTRAVENOUS at 09:40

## 2021-01-11 PROBLEM — E11.22 TYPE 2 DIABETES MELLITUS WITH STAGE 4 CHRONIC KIDNEY DISEASE, UNSPECIFIED WHETHER LONG TERM INSULIN USE (HCC): Status: ACTIVE | Noted: 2021-01-11

## 2021-01-11 PROBLEM — N18.4 TYPE 2 DIABETES MELLITUS WITH STAGE 4 CHRONIC KIDNEY DISEASE, UNSPECIFIED WHETHER LONG TERM INSULIN USE (HCC): Status: ACTIVE | Noted: 2021-01-11

## 2021-01-11 PROBLEM — R94.39 ABNORMAL NUCLEAR STRESS TEST: Status: ACTIVE | Noted: 2021-01-11

## 2021-01-21 DIAGNOSIS — Z01.812 PRE-PROCEDURE LAB EXAM: Primary | ICD-10-CM

## 2021-01-29 RX ORDER — MULTIVIT WITH MINERALS/LUTEIN
1000 TABLET ORAL DAILY
COMMUNITY

## 2021-01-29 RX ORDER — ISOSORBIDE MONONITRATE 60 MG/1
60 TABLET, EXTENDED RELEASE ORAL DAILY
COMMUNITY

## 2021-01-29 RX ORDER — ISOSORBIDE MONONITRATE 30 MG/1
30 TABLET, EXTENDED RELEASE ORAL DAILY
COMMUNITY

## 2021-01-29 RX ORDER — LANOLIN ALCOHOL/MO/W.PET/CERES
1000 CREAM (GRAM) TOPICAL DAILY
COMMUNITY

## 2021-01-30 ENCOUNTER — LAB SERVICES (OUTPATIENT)
Dept: LAB | Age: 66
End: 2021-01-30

## 2021-01-30 DIAGNOSIS — Z01.812 PRE-PROCEDURE LAB EXAM: ICD-10-CM

## 2021-01-30 LAB
SARS-COV-2 RNA RESP QL NAA+PROBE: NOT DETECTED
SERVICE CMNT-IMP: NORMAL
SERVICE CMNT-IMP: NORMAL

## 2021-01-30 PROCEDURE — U0003 INFECTIOUS AGENT DETECTION BY NUCLEIC ACID (DNA OR RNA); SEVERE ACUTE RESPIRATORY SYNDROME CORONAVIRUS 2 (SARS-COV-2) (CORONAVIRUS DISEASE [COVID-19]), AMPLIFIED PROBE TECHNIQUE, MAKING USE OF HIGH THROUGHPUT TECHNOLOGIES AS DESCRIBED BY CMS-2020-01-R: HCPCS | Performed by: PSYCHIATRY & NEUROLOGY

## 2021-01-30 PROCEDURE — U0005 INFEC AGEN DETEC AMPLI PROBE: HCPCS | Performed by: PSYCHIATRY & NEUROLOGY

## 2021-02-01 ENCOUNTER — HOSPITAL ENCOUNTER (OUTPATIENT)
Age: 66
Discharge: HOME | End: 2021-02-01
Attending: INTERNAL MEDICINE | Admitting: INTERNAL MEDICINE

## 2021-02-01 VITALS
BODY MASS INDEX: 32.18 KG/M2 | HEART RATE: 54 BPM | RESPIRATION RATE: 17 BRPM | DIASTOLIC BLOOD PRESSURE: 66 MMHG | OXYGEN SATURATION: 97 % | TEMPERATURE: 96.3 F | SYSTOLIC BLOOD PRESSURE: 173 MMHG | WEIGHT: 188.49 LBS | HEIGHT: 64 IN

## 2021-02-01 DIAGNOSIS — I11.0 HYPERTENSIVE HEART DISEASE WITH CONGESTIVE HEART FAILURE, UNSPECIFIED HEART FAILURE TYPE (CMD): ICD-10-CM

## 2021-02-01 DIAGNOSIS — R94.39 ABNORMAL STRESS TEST: ICD-10-CM

## 2021-02-01 LAB — GLUCOSE BLDC GLUCOMTR-MCNC: 117 MG/DL (ref 70–99)

## 2021-02-01 PROCEDURE — 82962 GLUCOSE BLOOD TEST: CPT

## 2021-02-01 PROCEDURE — 10006023 HB SUPPLY 272: Performed by: INTERNAL MEDICINE

## 2021-02-01 PROCEDURE — 10002801 HB RX 250 W/O HCPCS: Performed by: INTERNAL MEDICINE

## 2021-02-01 PROCEDURE — 99153 MOD SED SAME PHYS/QHP EA: CPT | Performed by: INTERNAL MEDICINE

## 2021-02-01 PROCEDURE — 10002800 HB RX 250 W HCPCS: Performed by: INTERNAL MEDICINE

## 2021-02-01 PROCEDURE — 93460 R&L HRT ART/VENTRICLE ANGIO: CPT | Performed by: INTERNAL MEDICINE

## 2021-02-01 PROCEDURE — C1887 CATHETER, GUIDING: HCPCS | Performed by: INTERNAL MEDICINE

## 2021-02-01 PROCEDURE — 13000001 HB PHASE II RECOVERY EA 30 MINUTES: Performed by: INTERNAL MEDICINE

## 2021-02-01 PROCEDURE — 10002802 HB RX 636: Performed by: INTERNAL MEDICINE

## 2021-02-01 PROCEDURE — 10004651 HB RX, NO CHARGE ITEM: Performed by: INTERNAL MEDICINE

## 2021-02-01 PROCEDURE — 10002807 HB RX 258

## 2021-02-01 PROCEDURE — C1894 INTRO/SHEATH, NON-LASER: HCPCS | Performed by: INTERNAL MEDICINE

## 2021-02-01 PROCEDURE — 99152 MOD SED SAME PHYS/QHP 5/>YRS: CPT | Performed by: INTERNAL MEDICINE

## 2021-02-01 PROCEDURE — 10002805 HB CONTRAST AGENT: Performed by: INTERNAL MEDICINE

## 2021-02-01 RX ORDER — 0.9 % SODIUM CHLORIDE 0.9 %
2 VIAL (ML) INJECTION EVERY 12 HOURS SCHEDULED
Status: DISCONTINUED | OUTPATIENT
Start: 2021-02-01 | End: 2021-02-01 | Stop reason: HOSPADM

## 2021-02-01 RX ORDER — ACETAMINOPHEN 325 MG/1
650 TABLET ORAL ONCE
Status: COMPLETED | OUTPATIENT
Start: 2021-02-01 | End: 2021-02-01

## 2021-02-01 RX ORDER — IODIXANOL 320 MG/ML
INJECTION, SOLUTION INTRAVASCULAR PRN
Status: DISCONTINUED | OUTPATIENT
Start: 2021-02-01 | End: 2021-02-01 | Stop reason: HOSPADM

## 2021-02-01 RX ORDER — SODIUM CHLORIDE 9 MG/ML
INJECTION, SOLUTION INTRAVENOUS CONTINUOUS
Status: DISCONTINUED | OUTPATIENT
Start: 2021-02-01 | End: 2021-02-01 | Stop reason: HOSPADM

## 2021-02-01 RX ORDER — SODIUM CHLORIDE 9 MG/ML
INJECTION, SOLUTION INTRAVENOUS CONTINUOUS
Status: CANCELLED | OUTPATIENT
Start: 2021-02-01 | End: 2021-02-01

## 2021-02-01 RX ORDER — ASPIRIN 325 MG
325 TABLET ORAL ONCE
Status: DISCONTINUED | OUTPATIENT
Start: 2021-02-01 | End: 2021-02-01 | Stop reason: HOSPADM

## 2021-02-01 RX ORDER — MIDAZOLAM HYDROCHLORIDE 1 MG/ML
INJECTION, SOLUTION INTRAMUSCULAR; INTRAVENOUS PRN
Status: DISCONTINUED | OUTPATIENT
Start: 2021-02-01 | End: 2021-02-01 | Stop reason: HOSPADM

## 2021-02-01 RX ORDER — LIDOCAINE HYDROCHLORIDE 20 MG/ML
INJECTION, SOLUTION EPIDURAL; INFILTRATION; INTRACAUDAL; PERINEURAL PRN
Status: DISCONTINUED | OUTPATIENT
Start: 2021-02-01 | End: 2021-02-01 | Stop reason: HOSPADM

## 2021-02-01 RX ORDER — SODIUM CHLORIDE 9 MG/ML
INJECTION, SOLUTION INTRAVENOUS
Status: COMPLETED
Start: 2021-02-01 | End: 2021-02-01

## 2021-02-01 RX ORDER — CLONIDINE HYDROCHLORIDE 0.1 MG/1
0.1 TABLET ORAL EVERY 4 HOURS PRN
Status: DISCONTINUED | OUTPATIENT
Start: 2021-02-01 | End: 2021-02-01 | Stop reason: HOSPADM

## 2021-02-01 RX ORDER — HYDRALAZINE HYDROCHLORIDE 20 MG/ML
10 INJECTION INTRAMUSCULAR; INTRAVENOUS EVERY 4 HOURS PRN
Status: DISCONTINUED | OUTPATIENT
Start: 2021-02-01 | End: 2021-02-01 | Stop reason: HOSPADM

## 2021-02-01 RX ORDER — HEPARIN SODIUM 1000 [USP'U]/ML
INJECTION, SOLUTION INTRAVENOUS; SUBCUTANEOUS PRN
Status: DISCONTINUED | OUTPATIENT
Start: 2021-02-01 | End: 2021-02-01 | Stop reason: HOSPADM

## 2021-02-01 RX ADMIN — SODIUM CHLORIDE: 9 INJECTION, SOLUTION INTRAVENOUS at 07:45

## 2021-02-01 RX ADMIN — ACETAMINOPHEN 650 MG: 325 TABLET ORAL at 12:56

## 2021-02-01 ASSESSMENT — PAIN SCALES - GENERAL
PAINLEVEL_OUTOF10: 1
PAINLEVEL_OUTOF10: 0
PAINLEVEL_OUTOF10: 2
PAINLEVEL_OUTOF10: 0
PAINLEVEL_OUTOF10: 0
PAINLEVEL_OUTOF10: 1
PAINLEVEL_OUTOF10: 0

## 2021-07-15 ENCOUNTER — HOSPITAL ENCOUNTER (INPATIENT)
Facility: HOSPITAL | Age: 66
LOS: 7 days | Discharge: HOME | DRG: 674 | End: 2021-07-22
Attending: EMERGENCY MEDICINE | Admitting: FAMILY MEDICINE
Payer: COMMERCIAL

## 2021-07-15 ENCOUNTER — APPOINTMENT (OUTPATIENT)
Dept: CT IMAGING | Facility: HOSPITAL | Age: 66
DRG: 674 | End: 2021-07-15
Attending: EMERGENCY MEDICINE
Payer: COMMERCIAL

## 2021-07-15 DIAGNOSIS — N17.9 ACUTE RENAL FAILURE SUPERIMPOSED ON CHRONIC KIDNEY DISEASE, UNSPECIFIED CKD STAGE, UNSPECIFIED ACUTE RENAL FAILURE TYPE (HCC): Primary | ICD-10-CM

## 2021-07-15 DIAGNOSIS — N18.4 TYPE 2 DIABETES MELLITUS WITH STAGE 4 CHRONIC KIDNEY DISEASE, UNSPECIFIED WHETHER LONG TERM INSULIN USE (HCC): ICD-10-CM

## 2021-07-15 DIAGNOSIS — N18.9 ACUTE RENAL FAILURE SUPERIMPOSED ON CHRONIC KIDNEY DISEASE, UNSPECIFIED CKD STAGE, UNSPECIFIED ACUTE RENAL FAILURE TYPE (HCC): Primary | ICD-10-CM

## 2021-07-15 DIAGNOSIS — E87.1 HYPONATREMIA: ICD-10-CM

## 2021-07-15 DIAGNOSIS — E87.2 METABOLIC ACIDOSIS: ICD-10-CM

## 2021-07-15 DIAGNOSIS — D63.1 ANEMIA DUE TO CHRONIC KIDNEY DISEASE, UNSPECIFIED CKD STAGE: ICD-10-CM

## 2021-07-15 DIAGNOSIS — E11.22 TYPE 2 DIABETES MELLITUS WITH STAGE 4 CHRONIC KIDNEY DISEASE, UNSPECIFIED WHETHER LONG TERM INSULIN USE (HCC): ICD-10-CM

## 2021-07-15 DIAGNOSIS — N18.9 ANEMIA DUE TO CHRONIC KIDNEY DISEASE, UNSPECIFIED CKD STAGE: ICD-10-CM

## 2021-07-15 PROBLEM — N19 RENAL FAILURE: Status: ACTIVE | Noted: 2021-07-15

## 2021-07-15 PROBLEM — E87.20 METABOLIC ACIDOSIS: Status: ACTIVE | Noted: 2021-07-15

## 2021-07-15 LAB
ALBUMIN SERPL-MCNC: 3 G/DL (ref 3.4–5)
ALBUMIN/GLOB SERPL: 0.9 {RATIO} (ref 1–2)
ALP LIVER SERPL-CCNC: 70 U/L
ALT SERPL-CCNC: 12 U/L
ANION GAP SERPL CALC-SCNC: 12 MMOL/L (ref 0–18)
AST SERPL-CCNC: 9 U/L (ref 15–37)
BASOPHILS # BLD AUTO: 0.04 X10(3) UL (ref 0–0.2)
BASOPHILS NFR BLD AUTO: 0.5 %
BILIRUB SERPL-MCNC: 0.3 MG/DL (ref 0.1–2)
BUN BLD-MCNC: 114 MG/DL (ref 7–18)
BUN/CREAT SERPL: 10 (ref 10–20)
CALCIUM BLD-MCNC: 8.6 MG/DL (ref 8.5–10.1)
CHLORIDE SERPL-SCNC: 105 MMOL/L (ref 98–112)
CO2 SERPL-SCNC: 15 MMOL/L (ref 21–32)
CREAT BLD-MCNC: 11.4 MG/DL
DEPRECATED HBV CORE AB SER IA-ACNC: 65.8 NG/ML
DEPRECATED RDW RBC AUTO: 53.1 FL (ref 35.1–46.3)
EOSINOPHIL # BLD AUTO: 0.15 X10(3) UL (ref 0–0.7)
EOSINOPHIL NFR BLD AUTO: 1.9 %
ERYTHROCYTE [DISTWIDTH] IN BLOOD BY AUTOMATED COUNT: 15 % (ref 11–15)
EST. AVERAGE GLUCOSE BLD GHB EST-MCNC: 128 MG/DL (ref 68–126)
GLOBULIN PLAS-MCNC: 3.5 G/DL (ref 2.8–4.4)
GLUCOSE BLD-MCNC: 121 MG/DL (ref 70–99)
GLUCOSE BLD-MCNC: 61 MG/DL (ref 70–99)
GLUCOSE BLD-MCNC: 72 MG/DL (ref 70–99)
GLUCOSE BLD-MCNC: 98 MG/DL (ref 70–99)
HBA1C MFR BLD HPLC: 6.1 % (ref ?–5.7)
HCT VFR BLD AUTO: 26.2 %
HGB BLD-MCNC: 8.4 G/DL
IMM GRANULOCYTES # BLD AUTO: 0.03 X10(3) UL (ref 0–1)
IMM GRANULOCYTES NFR BLD: 0.4 %
IRON SATURATION: 11 %
IRON SERPL-MCNC: 32 UG/DL
LIPASE SERPL-CCNC: 663 U/L (ref 73–393)
LYMPHOCYTES # BLD AUTO: 0.5 X10(3) UL (ref 1–4)
LYMPHOCYTES NFR BLD AUTO: 6.4 %
M PROTEIN MFR SERPL ELPH: 6.5 G/DL (ref 6.4–8.2)
MCH RBC QN AUTO: 31.6 PG (ref 26–34)
MCHC RBC AUTO-ENTMCNC: 32.1 G/DL (ref 31–37)
MCV RBC AUTO: 98.5 FL
MONOCYTES # BLD AUTO: 0.67 X10(3) UL (ref 0.1–1)
MONOCYTES NFR BLD AUTO: 8.6 %
NEUTROPHILS # BLD AUTO: 6.41 X10 (3) UL (ref 1.5–7.7)
NEUTROPHILS # BLD AUTO: 6.41 X10(3) UL (ref 1.5–7.7)
NEUTROPHILS NFR BLD AUTO: 82.2 %
OSMOLALITY SERPL CALC.SUM OF ELEC: 311 MOSM/KG (ref 275–295)
PLATELET # BLD AUTO: 241 10(3)UL (ref 150–450)
POTASSIUM SERPL-SCNC: 5.8 MMOL/L (ref 3.5–5.1)
RBC # BLD AUTO: 2.66 X10(6)UL
SODIUM SERPL-SCNC: 132 MMOL/L (ref 136–145)
TOTAL IRON BINDING CAPACITY: 297 UG/DL (ref 240–450)
TRANSFERRIN SERPL-MCNC: 199 MG/DL (ref 200–360)
WBC # BLD AUTO: 7.8 X10(3) UL (ref 4–11)

## 2021-07-15 PROCEDURE — 99223 1ST HOSP IP/OBS HIGH 75: CPT | Performed by: INTERNAL MEDICINE

## 2021-07-15 PROCEDURE — 74176 CT ABD & PELVIS W/O CONTRAST: CPT | Performed by: EMERGENCY MEDICINE

## 2021-07-15 RX ORDER — CARVEDILOL 12.5 MG/1
25 TABLET ORAL 2 TIMES DAILY WITH MEALS
Status: DISCONTINUED | OUTPATIENT
Start: 2021-07-16 | End: 2021-07-16

## 2021-07-15 RX ORDER — TORSEMIDE 20 MG/1
40 TABLET ORAL DAILY
Status: DISCONTINUED | OUTPATIENT
Start: 2021-07-16 | End: 2021-07-15

## 2021-07-15 RX ORDER — PROCHLORPERAZINE EDISYLATE 5 MG/ML
5 INJECTION INTRAMUSCULAR; INTRAVENOUS EVERY 8 HOURS PRN
Status: DISCONTINUED | OUTPATIENT
Start: 2021-07-15 | End: 2021-07-22

## 2021-07-15 RX ORDER — ACETAMINOPHEN 325 MG/1
650 TABLET ORAL EVERY 6 HOURS PRN
Status: DISCONTINUED | OUTPATIENT
Start: 2021-07-15 | End: 2021-07-22

## 2021-07-15 RX ORDER — ISOSORBIDE MONONITRATE 30 MG/1
60 TABLET, EXTENDED RELEASE ORAL DAILY
Status: DISCONTINUED | OUTPATIENT
Start: 2021-07-16 | End: 2021-07-22

## 2021-07-15 RX ORDER — MELATONIN
3 NIGHTLY PRN
Status: DISCONTINUED | OUTPATIENT
Start: 2021-07-15 | End: 2021-07-22

## 2021-07-15 RX ORDER — ONDANSETRON 2 MG/ML
4 INJECTION INTRAMUSCULAR; INTRAVENOUS EVERY 6 HOURS PRN
Status: DISCONTINUED | OUTPATIENT
Start: 2021-07-15 | End: 2021-07-22

## 2021-07-15 RX ORDER — ONDANSETRON 2 MG/ML
4 INJECTION INTRAMUSCULAR; INTRAVENOUS ONCE
Status: COMPLETED | OUTPATIENT
Start: 2021-07-15 | End: 2021-07-15

## 2021-07-15 RX ORDER — TORSEMIDE 20 MG/1
20 TABLET ORAL DAILY
Status: DISCONTINUED | OUTPATIENT
Start: 2021-07-16 | End: 2021-07-22

## 2021-07-15 RX ORDER — CLONIDINE HYDROCHLORIDE 0.1 MG/1
0.1 TABLET ORAL 4 TIMES DAILY
Refills: 0 | Status: DISCONTINUED | OUTPATIENT
Start: 2021-07-15 | End: 2021-07-15

## 2021-07-15 RX ORDER — HEPARIN SODIUM 5000 [USP'U]/ML
5000 INJECTION, SOLUTION INTRAVENOUS; SUBCUTANEOUS EVERY 12 HOURS SCHEDULED
Status: DISCONTINUED | OUTPATIENT
Start: 2021-07-15 | End: 2021-07-22

## 2021-07-15 RX ORDER — MINOXIDIL 2.5 MG/1
2.5 TABLET ORAL NIGHTLY
Status: ON HOLD | COMMUNITY
End: 2021-07-20

## 2021-07-15 RX ORDER — ONDANSETRON 2 MG/ML
4 INJECTION INTRAMUSCULAR; INTRAVENOUS EVERY 4 HOURS PRN
Status: ACTIVE | OUTPATIENT
Start: 2021-07-15 | End: 2021-07-15

## 2021-07-15 RX ORDER — ATORVASTATIN CALCIUM 10 MG/1
10 TABLET, FILM COATED ORAL NIGHTLY
Status: DISCONTINUED | OUTPATIENT
Start: 2021-07-15 | End: 2021-07-22

## 2021-07-15 RX ORDER — DEXTROSE MONOHYDRATE 25 G/50ML
50 INJECTION, SOLUTION INTRAVENOUS
Status: DISCONTINUED | OUTPATIENT
Start: 2021-07-15 | End: 2021-07-22

## 2021-07-15 RX ORDER — HYDRALAZINE HYDROCHLORIDE 20 MG/ML
10 INJECTION INTRAMUSCULAR; INTRAVENOUS EVERY 4 HOURS PRN
Status: DISCONTINUED | OUTPATIENT
Start: 2021-07-15 | End: 2021-07-22

## 2021-07-15 RX ORDER — TORSEMIDE 20 MG/1
20 TABLET ORAL DAILY
Status: DISCONTINUED | OUTPATIENT
Start: 2021-07-15 | End: 2021-07-15

## 2021-07-15 RX ORDER — SODIUM CHLORIDE 9 MG/ML
INJECTION, SOLUTION INTRAVENOUS CONTINUOUS
Status: DISCONTINUED | OUTPATIENT
Start: 2021-07-15 | End: 2021-07-17

## 2021-07-15 RX ORDER — SODIUM CHLORIDE 9 MG/ML
INJECTION, SOLUTION INTRAVENOUS CONTINUOUS
Status: DISCONTINUED | OUTPATIENT
Start: 2021-07-15 | End: 2021-07-15

## 2021-07-15 RX ORDER — DEXTROSE MONOHYDRATE 25 G/50ML
50 INJECTION, SOLUTION INTRAVENOUS
Status: DISCONTINUED | OUTPATIENT
Start: 2021-07-15 | End: 2021-07-15

## 2021-07-15 NOTE — ED PROVIDER NOTES
Patient Seen in: [facility] Emergency Department      History   Patient presents with:  Abdomen/Flank Pain    Stated Complaint: abd pain, vomiting, fatigue    HPI/Subjective:   HPI    60-year-old female presents to the emerge department with abdomin other systems reviewed and are negative. Positive for stated complaint: abd pain, vomiting, fatigue  Other systems are as noted in HPI. Constitutional and vital signs reviewed. All other systems reviewed and negative except as noted above.     Ph Result Value    Glucose 121 (*)     Sodium 132 (*)     Potassium 5.8 (*)     CO2 15.0 (*)      (*)     Creatinine 11.40 (*)     Calculated Osmolality 311 (*)     GFR, Non- 3 (*)     GFR, -American 4 (*)     AST 9 (*)     ALT ABDOMEN+PELVIS(CPT=74176)    Result Date: 7/15/2021  PROCEDURE:  CT ABDOMEN+PELVIS (CPT=74176)  COMPARISON:  [facility] , CT, CT ABDOMEN(W+WO)PELVIS(CNTRST ONLY)(CPT=74178), 6/26/2018, 11:13 AM.  INDICATIONS:  abd pain, vomiting, fatigue  TECHNIQUE:  Unenhance pelvis. No free air. ABDOMINAL WALL:  Mild diffuse anasarca. PELVIC ORGANS:  Wall thickening involving the urinary bladder with adjacent soft tissue stranding and small to moderate pelvic free fluid.   There is soft tissue stranding in the presacral space improved significantly when her acidemia improves. Patient was given bicarb in the emergency department and is on potassium binding medications at baseline.   This was written for by nephrology who graciously came down and evaluated the patient in the [address] N18.9 7/15/2021 Unknown    Anemia due to chronic kidney disease, unspecified CKD stage N18.9, D63.1 7/15/2021     Edema R60.9 Unknown Unknown    Hyperkalemia E87.5 Unknown Unknown    Hyponatremia E87.1 2/90/7776     Metabolic acidosis U40.6 4/44/6181     R

## 2021-07-15 NOTE — ED QUICK NOTES
Report called to [facility] [id number] for room [id number]. Room is dirty, will send patient when room is clean.

## 2021-07-15 NOTE — CONSULTS
[facility]  Report of Consultation    Blanca Grimes Patient Status:  Emergency    8/10/1955 MRN [id number]   Location [address] Attending Ursula Gates MD   Hosp Day # 0 PCP Holden Ospina MD     Reason for [facility] Comment:clinton    Current Medications:    Current Facility-Administered Medications:   •  0.9% NaCl infusion, , Intravenous, Continuous  Home Medications:  GLIPIZIDE 10 MG Oral Tab, TAKE 1/2 TABLET BY MOUTH TWICE DAILY BEFORE MEALS  hydrALAZINE HCl 100 MG Component Value Date    WBC 7.8 07/15/2021    HGB 8.4 07/15/2021    HCT 26.2 07/15/2021    .0 07/15/2021    CREATSERUM 11.40 07/15/2021     07/15/2021     07/15/2021    K 5.8 07/15/2021     07/15/2021    CO2 15.0 07/15/2021    G allowing me to participate in this patient's care. Please feel free to call me with any questions or concerns.     Yeison Victoria MD  7/15/2021  5:13 PM    CT reviewed; discussed w/ floor RN; send UA w/ urine culture; blood cultures;  start meropenem; gentl

## 2021-07-15 NOTE — ED INITIAL ASSESSMENT (HPI)
PT TO ED ROM HOME WITH C/O VOMITING \"ALL NIGHT\" DEHYDRATION, REPORTS GFR IS 9. + SHAKING, DENIES DIARRHEA OR FEVERS

## 2021-07-15 NOTE — ED QUICK NOTES
CT accompanied patient out of department for CT 49 y/o F presents to the ED c/o abdominal pain and scapular pain x4 days. States nausea and vomiting. Vomited once today. Currently doesn't feel nauseous. 6 out 10 pain. Burning pain. Hx of this pain in March and didn't see her doctor at that time. Pain since last year and now radiating to her back. Reporting constant pain. Pain worse while laying down. No changes to pain while eating. Unable to pass gas and reports passing gas last night. No bowel movements in 2 days. Denies fever, sick contact. Former smoker. PMHx & PSHx: appendectomy and ectopic pregnancy 49 y/o F presents to the ED c/o abdominal pain and scapular pain x4 days. States nausea and vomiting. Vomited once today. Currently doesn't feel nauseous. 6 out 10 pain. Burning pain. Hx of this pain in March and didn't see her doctor at that time. Pain since last year and now radiating to her back. Reporting constant pain. Pain worse while laying down. No changes to pain while eating. Unable to pass gas and reports passing gas last night. No bowel movements in 2 days. Denies fever, sick contact. Former smoker. PMHx & PSHx: appendectomy and ectopic pregnancy    Yoruba translation performed by Christa Lopez ED tech. 47 y/o F presents to the ED c/o abdominal pain and scapular pain x4 days. States nausea and vomiting. Vomited once today. Currently doesn't feel nauseous. 6 out 10 pain. Burning pain. Hx of this pain in March and didn't see her doctor at that time. Pain since last year and now radiating to her back. Reporting constant pain. Pain worse while laying down. No changes to pain while eating. Unable to pass gas and reports passing gas last night. No bowel movements in 2 days. Denies fever, sick contact. Former smoker. PMHx & PSHx: appendectomy and ectopic pregnancy    Setswana translation performed by Linnette Mayorga and interpreterer [id number]

## 2021-07-16 ENCOUNTER — APPOINTMENT (OUTPATIENT)
Dept: INTERVENTIONAL RADIOLOGY/VASCULAR | Facility: HOSPITAL | Age: 66
DRG: 674 | End: 2021-07-16
Attending: INTERNAL MEDICINE
Payer: COMMERCIAL

## 2021-07-16 LAB
ALBUMIN SERPL-MCNC: 2.8 G/DL (ref 3.4–5)
ALBUMIN/GLOB SERPL: 0.8 {RATIO} (ref 1–2)
ALP LIVER SERPL-CCNC: 67 U/L
ALT SERPL-CCNC: 10 U/L
ANION GAP SERPL CALC-SCNC: 10 MMOL/L (ref 0–18)
AST SERPL-CCNC: 11 U/L (ref 15–37)
BASOPHILS # BLD AUTO: 0.04 X10(3) UL (ref 0–0.2)
BASOPHILS NFR BLD AUTO: 0.6 %
BILIRUB SERPL-MCNC: 0.3 MG/DL (ref 0.1–2)
BILIRUB UR QL STRIP.AUTO: NEGATIVE
BUN BLD-MCNC: 123 MG/DL (ref 7–18)
BUN/CREAT SERPL: 10.9 (ref 10–20)
CALCIUM BLD-MCNC: 8 MG/DL (ref 8.5–10.1)
CHLORIDE SERPL-SCNC: 108 MMOL/L (ref 98–112)
CO2 SERPL-SCNC: 17 MMOL/L (ref 21–32)
COLOR UR AUTO: YELLOW
CREAT BLD-MCNC: 11.3 MG/DL
CREAT UR-SCNC: 125 MG/DL
DEPRECATED RDW RBC AUTO: 54.6 FL (ref 35.1–46.3)
EOSINOPHIL # BLD AUTO: 0.15 X10(3) UL (ref 0–0.7)
EOSINOPHIL NFR BLD AUTO: 2.3 %
ERYTHROCYTE [DISTWIDTH] IN BLOOD BY AUTOMATED COUNT: 15.3 % (ref 11–15)
GLOBULIN PLAS-MCNC: 3.3 G/DL (ref 2.8–4.4)
GLUCOSE BLD-MCNC: 105 MG/DL (ref 70–99)
GLUCOSE BLD-MCNC: 117 MG/DL (ref 70–99)
GLUCOSE BLD-MCNC: 118 MG/DL (ref 70–99)
GLUCOSE BLD-MCNC: 131 MG/DL (ref 70–99)
GLUCOSE BLD-MCNC: 155 MG/DL (ref 70–99)
GLUCOSE BLD-MCNC: 49 MG/DL (ref 70–99)
GLUCOSE BLD-MCNC: 94 MG/DL (ref 70–99)
GLUCOSE BLD-MCNC: 95 MG/DL (ref 70–99)
GLUCOSE UR STRIP.AUTO-MCNC: NEGATIVE MG/DL
HAV IGM SER QL: 2.6 MG/DL (ref 1.6–2.6)
HBV SURFACE AG SER-ACNC: <0.1 [IU]/L
HBV SURFACE AG SERPL QL IA: NONREACTIVE
HCT VFR BLD AUTO: 24.6 %
HGB BLD-MCNC: 7.7 G/DL
IMM GRANULOCYTES # BLD AUTO: 0.02 X10(3) UL (ref 0–1)
IMM GRANULOCYTES NFR BLD: 0.3 %
KETONES UR STRIP.AUTO-MCNC: NEGATIVE MG/DL
LYMPHOCYTES # BLD AUTO: 0.6 X10(3) UL (ref 1–4)
LYMPHOCYTES NFR BLD AUTO: 9.1 %
M PROTEIN MFR SERPL ELPH: 6.1 G/DL (ref 6.4–8.2)
MCH RBC QN AUTO: 31 PG (ref 26–34)
MCHC RBC AUTO-ENTMCNC: 31.3 G/DL (ref 31–37)
MCV RBC AUTO: 99.2 FL
MONOCYTES # BLD AUTO: 0.83 X10(3) UL (ref 0.1–1)
MONOCYTES NFR BLD AUTO: 12.6 %
NEUTROPHILS # BLD AUTO: 4.95 X10 (3) UL (ref 1.5–7.7)
NEUTROPHILS # BLD AUTO: 4.95 X10(3) UL (ref 1.5–7.7)
NEUTROPHILS NFR BLD AUTO: 75.1 %
NITRITE UR QL STRIP.AUTO: NEGATIVE
OSMOLALITY SERPL CALC.SUM OF ELEC: 319 MOSM/KG (ref 275–295)
PH UR STRIP.AUTO: 5 [PH] (ref 5–8)
PLATELET # BLD AUTO: 217 10(3)UL (ref 150–450)
POTASSIUM SERPL-SCNC: 5.6 MMOL/L (ref 3.5–5.1)
PROT UR STRIP.AUTO-MCNC: 100 MG/DL
RBC # BLD AUTO: 2.48 X10(6)UL
RBC UR QL AUTO: NEGATIVE
SODIUM SERPL-SCNC: 135 MMOL/L (ref 136–145)
SODIUM SERPL-SCNC: 18 MMOL/L
SP GR UR STRIP.AUTO: 1.01 (ref 1–1.03)
UROBILINOGEN UR STRIP.AUTO-MCNC: <2 MG/DL
WBC # BLD AUTO: 6.6 X10(3) UL (ref 4–11)

## 2021-07-16 PROCEDURE — 02HV33Z INSERTION OF INFUSION DEVICE INTO SUPERIOR VENA CAVA, PERCUTANEOUS APPROACH: ICD-10-PCS | Performed by: RADIOLOGY

## 2021-07-16 PROCEDURE — 99233 SBSQ HOSP IP/OBS HIGH 50: CPT | Performed by: INTERNAL MEDICINE

## 2021-07-16 RX ORDER — HEPARIN SODIUM 1000 [USP'U]/ML
1.5 INJECTION, SOLUTION INTRAVENOUS; SUBCUTANEOUS ONCE
Status: COMPLETED | OUTPATIENT
Start: 2021-07-16 | End: 2021-07-16

## 2021-07-16 RX ORDER — LIDOCAINE HYDROCHLORIDE 10 MG/ML
INJECTION, SOLUTION INFILTRATION; PERINEURAL
Status: COMPLETED
Start: 2021-07-16 | End: 2021-07-16

## 2021-07-16 RX ORDER — CARVEDILOL 12.5 MG/1
50 TABLET ORAL 2 TIMES DAILY WITH MEALS
Status: DISCONTINUED | OUTPATIENT
Start: 2021-07-16 | End: 2021-07-22

## 2021-07-16 RX ORDER — HEPARIN SODIUM 5000 [USP'U]/ML
INJECTION, SOLUTION INTRAVENOUS; SUBCUTANEOUS
Status: COMPLETED
Start: 2021-07-16 | End: 2021-07-16

## 2021-07-16 RX ORDER — MIDAZOLAM HYDROCHLORIDE 1 MG/ML
INJECTION INTRAMUSCULAR; INTRAVENOUS
Status: COMPLETED
Start: 2021-07-16 | End: 2021-07-16

## 2021-07-16 RX ORDER — ALBUMIN (HUMAN) 12.5 G/50ML
100 SOLUTION INTRAVENOUS AS NEEDED
Status: DISCONTINUED | OUTPATIENT
Start: 2021-07-16 | End: 2021-07-22

## 2021-07-16 NOTE — PROGRESS NOTES
NURSING ADMISSION NOTE      Patient admitted via Cart  Oriented to room. Safety precautions initiated. Bed in low position. Call light in reach. Admisson/ PTA medlist complete. Pt A&Ox4. Room air. Placed on telemetry, NSR. VSS.  IV fluids 50mL/hr

## 2021-07-16 NOTE — CM/SW NOTE
SHAUN started Portal for [facility] HD. Face sheet and Neph note to portal. Clinicals will need to be added as available.

## 2021-07-16 NOTE — PAYOR COMM NOTE
--------------  ADMISSION REVIEW     Payor: [employer]  Subscriber #:  [id number]  Authorization Number: [id number]    Admit date: 7/15/21  Admit time:  7:45 PM       Admitting Physician: Vaughn Zamarripa MD  Attending Physician:  Vaughn Zamarripa MD  Pr 1253]   BP (!) 172/70   Pulse 63   Resp 20   Temp 98.4 °F (36.9 °C)   Temp src Temporal   SpO2 97 %   O2 Device None (Room air)       Current:/61 (BP Location: Left arm)   Pulse 66   Temp 98.6 °F (37 °C) (Oral)   Resp 18   Ht 165.1 cm (5' 5\")   Wt 9 0.9 (*)     All other components within normal limits   LIPASE - Abnormal; Notable for the following components:    Lipase 663 (*)     All other components within normal limits   IRON AND TIBC - Abnormal; Notable for the following components:    Iron 32 (* from the dome of the diaphragm to the pubic symphysis. Dose reduction techniques were used. Dose information is transmitted to the [address] of Radiology) NRDR ([address]) which includes the Dose Index Registry.   PATIENT lower lumbar facets. CONCLUSION:  Moderate to marked perinephric stranding about both kidneys. No hydronephrosis. Multiple periaortic lymph nodes within normal limits in short axis diameter.   Recommend clinical correlation to exclude pyelonep do a CT scan of her abdomen pelvis and there is no evidence of acute obstruction. Patient will be admitted for further care and treatment of her acute renal failure. She was given medications for nausea.   She will be admitted to her primary care physicia Dose Route User    7/16/2021 0822 New Bag 200 mg Intravenous Comfort Florence RN      Isosorbide Mononitrate ER (IMDUR) 24 hr tab 60 mg     Date Action Dose Route User    7/16/2021 0491 Given 60 mg Oral Inés Mccord, RN      Meropenem (MERREM) 500 mg in mg, 60 mg, Oral, Daily  glucose (DEX4) oral liquid 15 g, 15 g, Oral, Q15 Min PRN   Or  Glucose-Vitamin C (DEX-4) chewable tab 4 tablet, 4 tablet, Oral, Q15 Min PRN   Or  dextrose 50 % injection 50 mL, 50 mL, Intravenous, Q15 Min PRN   Or  glucose (DEX4) or Recent Labs     07/15/21  1526 07/16/21  0557   ALT 12* 10*   AST 9* 11*   ALB 3.0* 2.8*            Imaging:  Reviewed      Impression:  1. RITA/CKD V- not on dialysis - follows w/ Dr. Umberto Davies @ [facility]- etiology noted to be due to DM/HTN; baseline Cr as of r

## 2021-07-16 NOTE — CONSULTS
[facility] Dosing Service  Antibiotic Dosing    Kerry Nguyen is a 60year old for whom pharmacy is dosing Merrem for treatment of  UTI.  .  Other antibiotics (Not dosed by pharmacy): None    Allergies: is allergic to ace inhibitors, dihydrocodeine, codei

## 2021-07-16 NOTE — PROGRESS NOTES
07/16/21 1431   Clinical Encounter Type   Visited With Family; Health care provider  (LINDA Delacruz Courser)   Routine Visit   (Responded to AD consult)   Patient's Supportive Strategies/Resources Identified patient's family support.  Also learned that the patient's

## 2021-07-16 NOTE — PLAN OF CARE
Pt A&Ox4. Room air, o2 >90%. Tele, NSR. Afebrile. Heparin subQ. SCDs, knee high annabel perri ordered. Pt states \" I've been having trouble urinating for the past 3 days\", MD aware. Bladder scan done on admission, read 244. Straight cath ordered as needed.  Ne INTERVENTIONS:  - Encourage pt to monitor pain and request assistance  - Assess pain using appropriate pain scale  - Administer analgesics based on type and severity of pain and evaluate response  - Implement non-pharmacological measures as appropriate and discharge as needed  - Consider post-discharge preferences of patient/family/discharge partner  - Complete POLST form as appropriate  - Assess patient's ability to be responsible for managing their own health  - Refer to Case Management Department for coor appropriate  Outcome: Progressing

## 2021-07-16 NOTE — CONSULTS
Gastroenterology Initial Consultation  I have personally seen and examined the patient.     Patient Name: Zackery Ashley  Referring physician: Dr. Karen Mitchell  Reason for consultation: Vomiting, abnormal lipase  CC: Nausea/vomiting  HPI: This is a 59 yo woman wit 5000 UNIT/ML injection, , ,   [COMPLETED] Midazolam HCl (VERSED) 2 MG/2ML injection, , ,   [COMPLETED] ondansetron HCl (ZOFRAN) injection 4 mg, 4 mg, Intravenous, Once  [COMPLETED] sodium bicarbonate injection 50 mEq, 50 mEq, Intravenous, Once  patiromer ( other illicit substances. FamHx: The patient has no family history of colon cancer or other gastrointestinal malignancies;   No family history of ulcer disease, or inflammatory bowel disease  ROS:  In addition to the pertinent positives described above: hepatosplenomegaly; no rebound or guarding;  No ascites is clinically apparent; no tympany to percussion  Ext: 3+ bilateral pitting edema of the legs  Skin: Warm and dry  Psychiatric: Appropriate mood and congruent affect without obvious depression or anxie FINDINGS:     LUNG BASE:  Small bilateral pleural effusions with bibasilar atelectasis. [place]    LIVER: [place] Del is a mild amount of ascites in the right subphrenic space.  No obvious mass arising from the liver on this noncontrast study.     BILIARY:  Cholelith of the urinary bladder with adjacent soft tissue stranding and free fluid.  Recommend clinical correlation to exclude cystitis.       Small bilateral pleural effusions, anasarca, free fluid in the subdiaphragmatic spaces tracking along the pericolic gutter

## 2021-07-16 NOTE — PROGRESS NOTES
[facility]  Progress Note    Megan Abel Patient Status:  Emergency    8/10/1955 MRN [id number]   Location [address] [facility] Attending Chuck Nuñez MD   Hosp Day # 1 PCP Renaldo Litten, MD     Overall feels better  Denies an Nightly PRN  torsemide (DEMADEX) tab 20 mg, 20 mg, Oral, Daily        Physical Exam:  Vital signs: Blood pressure 131/43, pulse 66, temperature 97.6 °F (36.4 °C), temperature source Oral, resp.  rate 20, height 5' 5\" (1.651 m), weight 218 lb (98.9 kg), las coreg/imur/UF w/ HD today as tolerated upto 2L  6. Edema         Thank you for allowing me to participate in this patient's care. Please feel free to call me with any questions or concerns.     Ivy Cogan, MD  7/16/2021

## 2021-07-16 NOTE — PROCEDURES
[facility]  Procedure Note    Blanca Grimes Patient Status:  Inpatient    8/10/1955 MRN [id number]   Location [address] [facility] Attending Jairo Dodson MD   Hosp Day # 1 PCP Holden Ospina MD     Procedure: temporary right

## 2021-07-16 NOTE — PROGRESS NOTES
Pt AOx4. VSS on RA. Tele NSR. Heparin held this am for temp dialysis cath placement. Pt up SBA with walker, voiding per commode. Temp RIJ placed this am. Dialysis started this afternoon. Pt hypoglycemic this afternoon, replaced per protocol.  Renal diet, to

## 2021-07-17 PROBLEM — D63.1 ANEMIA IN ESRD (END-STAGE RENAL DISEASE) (HCC): Status: ACTIVE | Noted: 2021-07-15

## 2021-07-17 PROBLEM — D63.1 ANEMIA IN ESRD (END-STAGE RENAL DISEASE): Status: ACTIVE | Noted: 2021-07-15

## 2021-07-17 PROBLEM — N18.6 ANEMIA IN ESRD (END-STAGE RENAL DISEASE) (HCC): Status: ACTIVE | Noted: 2021-07-15

## 2021-07-17 PROBLEM — N19 UREMIA: Status: ACTIVE | Noted: 2021-07-15

## 2021-07-17 PROBLEM — N18.6 ANEMIA IN ESRD (END-STAGE RENAL DISEASE): Status: ACTIVE | Noted: 2021-07-15

## 2021-07-17 LAB
ALBUMIN SERPL-MCNC: 2.7 G/DL (ref 3.4–5)
ALBUMIN/GLOB SERPL: 0.7 {RATIO} (ref 1–2)
ALP LIVER SERPL-CCNC: 71 U/L
ALT SERPL-CCNC: 10 U/L
ANION GAP SERPL CALC-SCNC: 7 MMOL/L (ref 0–18)
AST SERPL-CCNC: 11 U/L (ref 15–37)
BASOPHILS # BLD AUTO: 0.02 X10(3) UL (ref 0–0.2)
BASOPHILS NFR BLD AUTO: 0.5 %
BILIRUB SERPL-MCNC: 0.4 MG/DL (ref 0.1–2)
BUN BLD-MCNC: 56 MG/DL (ref 7–18)
BUN/CREAT SERPL: 8.6 (ref 10–20)
CALCIUM BLD-MCNC: 8 MG/DL (ref 8.5–10.1)
CHLORIDE SERPL-SCNC: 106 MMOL/L (ref 98–112)
CO2 SERPL-SCNC: 24 MMOL/L (ref 21–32)
CREAT BLD-MCNC: 6.51 MG/DL
DEPRECATED RDW RBC AUTO: 54.4 FL (ref 35.1–46.3)
EOSINOPHIL # BLD AUTO: 0.06 X10(3) UL (ref 0–0.7)
EOSINOPHIL NFR BLD AUTO: 1.5 %
ERYTHROCYTE [DISTWIDTH] IN BLOOD BY AUTOMATED COUNT: 15.3 % (ref 11–15)
GLOBULIN PLAS-MCNC: 3.7 G/DL (ref 2.8–4.4)
GLUCOSE BLD-MCNC: 105 MG/DL (ref 70–99)
GLUCOSE BLD-MCNC: 133 MG/DL (ref 70–99)
GLUCOSE BLD-MCNC: 137 MG/DL (ref 70–99)
GLUCOSE BLD-MCNC: 146 MG/DL (ref 70–99)
GLUCOSE BLD-MCNC: 155 MG/DL (ref 70–99)
GLUCOSE BLD-MCNC: 173 MG/DL (ref 70–99)
GLUCOSE BLD-MCNC: 178 MG/DL (ref 70–99)
GLUCOSE BLD-MCNC: 209 MG/DL (ref 70–99)
GLUCOSE BLD-MCNC: 40 MG/DL (ref 70–99)
GLUCOSE BLD-MCNC: 43 MG/DL (ref 70–99)
HAV IGM SER QL: 2.3 MG/DL (ref 1.6–2.6)
HCT VFR BLD AUTO: 24.9 %
HGB BLD-MCNC: 7.9 G/DL
IMM GRANULOCYTES # BLD AUTO: 0.04 X10(3) UL (ref 0–1)
IMM GRANULOCYTES NFR BLD: 1 %
LIPASE SERPL-CCNC: 646 U/L (ref 73–393)
LYMPHOCYTES # BLD AUTO: 0.36 X10(3) UL (ref 1–4)
LYMPHOCYTES NFR BLD AUTO: 9 %
M PROTEIN MFR SERPL ELPH: 6.4 G/DL (ref 6.4–8.2)
MCH RBC QN AUTO: 31.3 PG (ref 26–34)
MCHC RBC AUTO-ENTMCNC: 31.7 G/DL (ref 31–37)
MCV RBC AUTO: 98.8 FL
MONOCYTES # BLD AUTO: 0.35 X10(3) UL (ref 0.1–1)
MONOCYTES NFR BLD AUTO: 8.7 %
NEUTROPHILS # BLD AUTO: 3.18 X10 (3) UL (ref 1.5–7.7)
NEUTROPHILS # BLD AUTO: 3.18 X10(3) UL (ref 1.5–7.7)
NEUTROPHILS NFR BLD AUTO: 79.3 %
OSMOLALITY SERPL CALC.SUM OF ELEC: 302 MOSM/KG (ref 275–295)
PLATELET # BLD AUTO: 162 10(3)UL (ref 150–450)
POTASSIUM SERPL-SCNC: 4.5 MMOL/L (ref 3.5–5.1)
PROCALCITONIN SERPL-MCNC: 0.11 NG/ML (ref ?–0.16)
RBC # BLD AUTO: 2.52 X10(6)UL
SODIUM SERPL-SCNC: 137 MMOL/L (ref 136–145)
WBC # BLD AUTO: 4 X10(3) UL (ref 4–11)

## 2021-07-17 PROCEDURE — 99233 SBSQ HOSP IP/OBS HIGH 50: CPT | Performed by: INTERNAL MEDICINE

## 2021-07-17 RX ORDER — HEPARIN SODIUM 1000 [USP'U]/ML
1500 INJECTION, SOLUTION INTRAVENOUS; SUBCUTANEOUS
Status: DISCONTINUED | OUTPATIENT
Start: 2021-07-17 | End: 2021-07-22

## 2021-07-17 NOTE — H&P
[address] Patient Status:  Inpatient    8/10/1955 MRN [id number]   [facility] 5NW-A Attending Darcy Bell MD   Hosp Day # 2 PCP Deepak Drew MD     History of Present Illness:  Suzette Sargent HPI.    Physical Exam:  /55 (BP Location: Left arm)   Pulse 75   Temp 98 °F (36.7 °C) (Axillary)   Resp 22   Ht 5' 5\" (1.651 m)   Wt 218 lb (98.9 kg)   LMP 07/04/2010   SpO2 95%   BMI 36.28 kg/m²     General Appearance:    Alert, cooperative, no dis 07/16/2021     07/16/2021    CO2 17.0 07/16/2021    GLU 95 07/16/2021    CA 8.0 07/16/2021    ALB 2.8 07/16/2021    ALKPHO 67 07/16/2021    BILT 0.3 07/16/2021    TP 6.1 07/16/2021    AST 11 07/16/2021    ALT 10 07/16/2021    MG 2.6 07/16/2021    PGL monitor  4. Hyperkalemia/acidosis due to above  - on chronic valtessa- continue; also give IV bicarb to correct metabolic acidosis  5. Anemia due to CKD- on BURTON in o/p setting; check iron studies  6.  HTN - stable currently; suspect a volume component - giancarlo

## 2021-07-17 NOTE — PROGRESS NOTES
Pt AOx4. Bps elevated this shift, gave scheduled coreg and prn hydralazine. Tele NSR- ST. Afebrile. Pt on RA, maintaining O2 sats >90%. Dialysis this am, tolerated well, just claims she is tired. SBA to commode. Temp RIJ. Renal diet, tolerating well.  Pt an

## 2021-07-17 NOTE — PLAN OF CARE
Problem: Diabetes/Glucose Control  Goal: Glucose maintained within prescribed range  Description: INTERVENTIONS:  - Monitor Blood Glucose as ordered  - Assess for signs and symptoms of hyperglycemia and hypoglycemia  - Administer ordered medications to m Monitor WBC  - Administer growth factors as ordered  - Implement neutropenic guidelines  Outcome: Progressing     Problem: SAFETY ADULT - FALL  Goal: Free from fall injury  Description: INTERVENTIONS:  - Assess pt frequently for physical needs  - Identify Assess barriers to adequate nutritional intake and initiate nutrition consult as needed  - Instruct patient on self management of diabetes  Outcome: Progressing  Goal: Electrolytes maintained within normal limits  Description: INTERVENTIONS:  - Monitor Blo

## 2021-07-17 NOTE — PROGRESS NOTES
[facility]     Nephrology Progress Note    Meganerika Roman Patient Status:  Inpatient    8/10/1955 MRN [id number]   [facility] 5NW-A Attending Sherlynn Cheadle, MD   Hosp Day # 2 PCP Renaldo Litten, MD       SUBJECTIVE:  Has been hypogl PGLU 43* 40* 137* 105* 155*       Meds:   Albumin Human (ALBUMINAR) 25 % solution 100 mL, 100 mL, Intravenous, PRN  iron sucrose (VENOFER) IV Push 200 mg, 200 mg, Intravenous, Daily  carvedilol (COREG) tab 50 mg, 50 mg, Oral, BID with meals  patiromer (V length          Annell Mole  7/17/2021  11:31 AM

## 2021-07-18 LAB
ALBUMIN SERPL-MCNC: 2.6 G/DL (ref 3.4–5)
ALBUMIN/GLOB SERPL: 0.7 {RATIO} (ref 1–2)
ALP LIVER SERPL-CCNC: 68 U/L
ALT SERPL-CCNC: 11 U/L
ANION GAP SERPL CALC-SCNC: 8 MMOL/L (ref 0–18)
AST SERPL-CCNC: 15 U/L (ref 15–37)
BASOPHILS # BLD AUTO: 0.02 X10(3) UL (ref 0–0.2)
BASOPHILS NFR BLD AUTO: 0.4 %
BILIRUB SERPL-MCNC: 0.4 MG/DL (ref 0.1–2)
BUN BLD-MCNC: 36 MG/DL (ref 7–18)
BUN/CREAT SERPL: 6.9 (ref 10–20)
CALCIUM BLD-MCNC: 8 MG/DL (ref 8.5–10.1)
CHLORIDE SERPL-SCNC: 102 MMOL/L (ref 98–112)
CO2 SERPL-SCNC: 25 MMOL/L (ref 21–32)
CREAT BLD-MCNC: 5.25 MG/DL
DEPRECATED RDW RBC AUTO: 56.3 FL (ref 35.1–46.3)
EOSINOPHIL # BLD AUTO: 0.1 X10(3) UL (ref 0–0.7)
EOSINOPHIL NFR BLD AUTO: 1.8 %
ERYTHROCYTE [DISTWIDTH] IN BLOOD BY AUTOMATED COUNT: 15.4 % (ref 11–15)
GLOBULIN PLAS-MCNC: 3.6 G/DL (ref 2.8–4.4)
GLUCOSE BLD-MCNC: 105 MG/DL (ref 70–99)
GLUCOSE BLD-MCNC: 141 MG/DL (ref 70–99)
GLUCOSE BLD-MCNC: 143 MG/DL (ref 70–99)
GLUCOSE BLD-MCNC: 151 MG/DL (ref 70–99)
GLUCOSE BLD-MCNC: 156 MG/DL (ref 70–99)
HCT VFR BLD AUTO: 26 %
HGB BLD-MCNC: 8 G/DL
IMM GRANULOCYTES # BLD AUTO: 0.03 X10(3) UL (ref 0–1)
IMM GRANULOCYTES NFR BLD: 0.5 %
LIPASE SERPL-CCNC: 274 U/L (ref 73–393)
LYMPHOCYTES # BLD AUTO: 0.48 X10(3) UL (ref 1–4)
LYMPHOCYTES NFR BLD AUTO: 8.7 %
M PROTEIN MFR SERPL ELPH: 6.2 G/DL (ref 6.4–8.2)
MCH RBC QN AUTO: 30.9 PG (ref 26–34)
MCHC RBC AUTO-ENTMCNC: 30.8 G/DL (ref 31–37)
MCV RBC AUTO: 100.4 FL
MONOCYTES # BLD AUTO: 0.83 X10(3) UL (ref 0.1–1)
MONOCYTES NFR BLD AUTO: 15.1 %
NEUTROPHILS # BLD AUTO: 4.05 X10 (3) UL (ref 1.5–7.7)
NEUTROPHILS # BLD AUTO: 4.05 X10(3) UL (ref 1.5–7.7)
NEUTROPHILS NFR BLD AUTO: 73.5 %
OSMOLALITY SERPL CALC.SUM OF ELEC: 289 MOSM/KG (ref 275–295)
PLATELET # BLD AUTO: 146 10(3)UL (ref 150–450)
POTASSIUM SERPL-SCNC: 3.9 MMOL/L (ref 3.5–5.1)
RBC # BLD AUTO: 2.59 X10(6)UL
SODIUM SERPL-SCNC: 135 MMOL/L (ref 136–145)
WBC # BLD AUTO: 5.5 X10(3) UL (ref 4–11)

## 2021-07-18 PROCEDURE — 99233 SBSQ HOSP IP/OBS HIGH 50: CPT | Performed by: INTERNAL MEDICINE

## 2021-07-18 RX ORDER — HEPARIN SODIUM 1000 [USP'U]/ML
1.5 INJECTION, SOLUTION INTRAVENOUS; SUBCUTANEOUS ONCE
Status: COMPLETED | OUTPATIENT
Start: 2021-07-18 | End: 2021-07-21

## 2021-07-18 RX ORDER — LOSARTAN POTASSIUM 25 MG/1
25 TABLET ORAL DAILY
Status: DISCONTINUED | OUTPATIENT
Start: 2021-07-18 | End: 2021-07-19

## 2021-07-18 RX ORDER — PANTOPRAZOLE SODIUM 40 MG/1
40 TABLET, DELAYED RELEASE ORAL
Status: DISCONTINUED | OUTPATIENT
Start: 2021-07-19 | End: 2021-07-22

## 2021-07-18 NOTE — PROGRESS NOTES
[facility]     Nephrology Progress Note    Kay De Souza Patient Status:  Inpatient    8/10/1955 MRN [id number]   [facility] 5NW-A Attending Chelle Gutierrez MD   Hosp Day # 3 PCP Pardeep Mendoza MD       SUBJECTIVE:  Stable today, n 10* 11*   AST 9* 11* 11* 15   ALB 3.0* 2.8* 2.7* 2.6*       Recent Labs   Lab 07/17/21  1503 07/17/21  1807 07/17/21  2111 07/17/21  2329 07/18/21  0749   PGLU 133* 209* 173* 178* 151*       Meds:   [START ON 7/19/2021] Pantoprazole Sodium (PROTONIX) EC ta considered PD but now has chosen HD as modality of choice. Will plan for AVF as outpt. S/p temp cath and will need tunneled cath Monday. outpt HD at [address] [facility]. PC and HD tomorrow  2.  Anemia due to ESRD- on BURTON in o/p setting; IV iron/BURTON w/ HD

## 2021-07-18 NOTE — PROGRESS NOTES
Pt is AOx4. Bps elevated, losartan added. Other VSS on RA, maintaining O2 sats >90%. Tele NSR. Refused heparin this am. Renal diet, QID. Dialysis ordered for tomorrow, this writer called [facility] to schedule. SBA with walker to bathroom.  No c/o pain or na

## 2021-07-18 NOTE — PLAN OF CARE
Pt is A&Ox4, drowsy at times. VSS, afebrile. Maintaining O2 sats WDL on RA. Tele, NSR. Heparin. EP. Tolerating renal diet, QID accu. Voids- decreased urine output. HD on 7/17, 2.5L off. pt had episode of emesis this AM- Zofran given. Up SBA. Denies pain.  P anxiety  - Utilize distraction and/or relaxation techniques  - Monitor for opioid side effects  - Notify MD/LIP if interventions unsuccessful or patient reports new pain  - Anticipate increased pain with activity and pre-medicate as appropriate  Outcome: P related to functional status, cognitive ability or social support system  Outcome: Progressing     Problem: METABOLIC/FLUID AND ELECTROLYTES - ADULT  Goal: Glucose maintained within prescribed range  Description: INTERVENTIONS:  - Monitor Blood Glucose as

## 2021-07-18 NOTE — PROGRESS NOTES
[facility]  Progress Note    Meganerika Roman Patient Status:  Inpatient    8/10/1955 MRN [id number]   [facility] 5NW-A Attending Sherlynn Cheadle, MD   Hosp Day # 3 PCP Renaldo Litten, MD       SUBJECTIVE:  No CP, SOB, or N/V.   Pleasant ultrasound was performed of the neck to assess the patency and size of both internal jugular veins, and the relationship of the jugular veins to the carotid arteries.  Permanent images were stored in the PACS system.       An ultrasound was perform with st catheter placement.               Dictated by (CST): Kenna Cohen MD on 7/16/2021 at 10:52 AM       Finalized by (CST): Kenna Cohen MD on 7/16/2021 at 10:55 AM      CT ABDOMEN+PELVIS(CPT=74176) [[id number]] Collected: 07/15/21 0224   Order Status: Comple diameter.  Soft tissue stranding in the gastrohepatic ligament and surrounding the celiac axis. BOWEL/MESENTERY:  Normal caliber small bowel loops. . Uncomplicated colonic diverticulosis.  No dilated small bowel. Rommie Ruff is a small amount of free fluid in 200 mg, Intravenous, Daily  carvedilol (COREG) tab 50 mg, 50 mg, Oral, BID with meals  patiromer (VELTASSA) 8.4 g packet 8.4 g, 8.4 g, Oral, Nightly  hydrALAzine HCl (APRESOLINE) injection 10 mg, 10 mg, Intravenous, Q4H PRN  Meropenem (MERREM) 500 mg in so term insulin use (HCC)     Abnormal nuclear stress test     Acute renal failure superimposed on chronic kidney disease ([facility]     Acute renal failure superimposed on chronic kidney disease, unspecified CKD stage, unspecified acute renal failure type ([facility]

## 2021-07-19 LAB
ALBUMIN SERPL-MCNC: 2.7 G/DL (ref 3.4–5)
ALBUMIN/GLOB SERPL: 0.8 {RATIO} (ref 1–2)
ALP LIVER SERPL-CCNC: 63 U/L
ALT SERPL-CCNC: 10 U/L
ANION GAP SERPL CALC-SCNC: 8 MMOL/L (ref 0–18)
AST SERPL-CCNC: 12 U/L (ref 15–37)
BASOPHILS # BLD AUTO: 0.03 X10(3) UL (ref 0–0.2)
BASOPHILS NFR BLD AUTO: 0.5 %
BILIRUB SERPL-MCNC: 0.5 MG/DL (ref 0.1–2)
BUN BLD-MCNC: 46 MG/DL (ref 7–18)
BUN/CREAT SERPL: 7.1 (ref 10–20)
CALCIUM BLD-MCNC: 8.1 MG/DL (ref 8.5–10.1)
CHLORIDE SERPL-SCNC: 101 MMOL/L (ref 98–112)
CO2 SERPL-SCNC: 26 MMOL/L (ref 21–32)
CREAT BLD-MCNC: 6.44 MG/DL
DEPRECATED RDW RBC AUTO: 56.6 FL (ref 35.1–46.3)
EOSINOPHIL # BLD AUTO: 0.21 X10(3) UL (ref 0–0.7)
EOSINOPHIL NFR BLD AUTO: 3.4 %
ERYTHROCYTE [DISTWIDTH] IN BLOOD BY AUTOMATED COUNT: 15.4 % (ref 11–15)
GLOBULIN PLAS-MCNC: 3.2 G/DL (ref 2.8–4.4)
GLUCOSE BLD-MCNC: 129 MG/DL (ref 70–99)
GLUCOSE BLD-MCNC: 179 MG/DL (ref 70–99)
GLUCOSE BLD-MCNC: 66 MG/DL (ref 70–99)
GLUCOSE BLD-MCNC: 75 MG/DL (ref 70–99)
GLUCOSE BLD-MCNC: 83 MG/DL (ref 70–99)
HBV CORE AB SERPL QL IA: NONREACTIVE
HBV SURFACE AB SER QL: NONREACTIVE
HBV SURFACE AB SERPL IA-ACNC: <3.1 MIU/ML
HBV SURFACE AG SER-ACNC: <0.1 [IU]/L
HBV SURFACE AG SERPL QL IA: NONREACTIVE
HCT VFR BLD AUTO: 24.8 %
HGB BLD-MCNC: 7.5 G/DL
IMM GRANULOCYTES # BLD AUTO: 0.04 X10(3) UL (ref 0–1)
IMM GRANULOCYTES NFR BLD: 0.6 %
LYMPHOCYTES # BLD AUTO: 0.77 X10(3) UL (ref 1–4)
LYMPHOCYTES NFR BLD AUTO: 12.5 %
M PROTEIN MFR SERPL ELPH: 5.9 G/DL (ref 6.4–8.2)
MCH RBC QN AUTO: 31.3 PG (ref 26–34)
MCHC RBC AUTO-ENTMCNC: 30.2 G/DL (ref 31–37)
MCV RBC AUTO: 103.3 FL
MONOCYTES # BLD AUTO: 1.12 X10(3) UL (ref 0.1–1)
MONOCYTES NFR BLD AUTO: 18.2 %
NEUTROPHILS # BLD AUTO: 3.99 X10 (3) UL (ref 1.5–7.7)
NEUTROPHILS # BLD AUTO: 3.99 X10(3) UL (ref 1.5–7.7)
NEUTROPHILS NFR BLD AUTO: 64.8 %
OSMOLALITY SERPL CALC.SUM OF ELEC: 290 MOSM/KG (ref 275–295)
PLATELET # BLD AUTO: 137 10(3)UL (ref 150–450)
POTASSIUM SERPL-SCNC: 4.1 MMOL/L (ref 3.5–5.1)
RBC # BLD AUTO: 2.4 X10(6)UL
SODIUM SERPL-SCNC: 135 MMOL/L (ref 136–145)
WBC # BLD AUTO: 6.2 X10(3) UL (ref 4–11)

## 2021-07-19 PROCEDURE — 99233 SBSQ HOSP IP/OBS HIGH 50: CPT | Performed by: INTERNAL MEDICINE

## 2021-07-19 RX ORDER — LOSARTAN POTASSIUM 100 MG/1
100 TABLET ORAL DAILY
Status: DISCONTINUED | OUTPATIENT
Start: 2021-07-20 | End: 2021-07-22

## 2021-07-19 RX ORDER — AMLODIPINE BESYLATE 5 MG/1
5 TABLET ORAL DAILY
Status: DISCONTINUED | OUTPATIENT
Start: 2021-07-19 | End: 2021-07-20

## 2021-07-19 NOTE — PROGRESS NOTES
[facility]  Progress Note    Jaylinjosephlee Nearing Patient Status:  Inpatient    8/10/1955 MRN [id number]   [facility] 5NW-A Attending Asaf Staples MD   Hosp Day # 3 PCP Shady Anderson MD       SUBJECTIVE:  No CP, SOB, or N/V.   Less cali Daily  epoetin trisha-epbx (RETACRIT) 65796 UNIT/ML injection 10,000 Units, 10,000 Units, Subcutaneous, PRN Dialysis  heparin sodium 1000 UNIT/ML injection 1,500 Units, 1,500 Units, Intravenous, PRN Dialysis  Albumin Human (ALBUMINAR) 25 % solution 100 mL, 1 chronic diastolic congestive heart failure (HCC)     Aortic stenosis, mild     Precordial chest pain     Mixed hyperlipidemia due to type 2 diabetes mellitus ([facility]     Type 2 diabetes mellitus with stage 4 chronic kidney disease, unspecified whether long t

## 2021-07-19 NOTE — CONSULTS
INFECTIOUS DISEASE [facility] Patient Status:  Inpatient    8/10/1955 MRN [id number]   [facility] 5NW-A Attending Rachel Alvarez MD   [facility] Day # 4 PCP Herb Mclean use. She reports that she does not use drugs. Allergies:     Ace Inhibitors          SWELLING  Dihydrocodeine          HIVES  Codeine [Opioid Sue*    HIVES  Egg                     OTHER (SEE COMMENTS)    Comment:excema    Medications:    Current Facil UNIT/ML flexpen 2-10 Units, 2-10 Units, Subcutaneous, TID CC and HS  •  melatonin tab 3 mg, 3 mg, Oral, Nightly PRN  •  torsemide (DEMADEX) tab 20 mg, 20 mg, Oral, Daily  No current facility-administered medications on file prior to encounter.   minoxidil 2 masses  Respiratory: Non labored, symmetric exursion  Cardiovascular: No irregularities in rhythm  Abdomen: Soft, nontender, nondistended. Musculoskeletal: Full range of motion of all extremities. No swelling noted.   Joints: no effusions  Skin: No lesi diabetes mellitus with stage 4 chronic kidney disease, unspecified whether long term insulin use (HCC)     Abnormal nuclear stress test     Acute renal failure superimposed on chronic kidney disease ([facility]     Acute renal failure superimposed on chronic kid

## 2021-07-19 NOTE — CM/SW NOTE
Received call from Hellen at [address] [facility]. Patient is setup at [address] [place] [address] MWF at 5am. Needs HD flowsheets or orders, Hep B antigen, antibody and core - new order placed, vaccine and allergies.  Uploaded into portal.    Attempted to review w/patient

## 2021-07-19 NOTE — PAYOR COMM NOTE
--------------  CONTINUED STAY REVIEW    Payor: [facility]  Subscriber #:  [id number]  Authorization Number: [id number]    Admit date: 7/15/21  Admit time:  7:45 PM    Admitting Physician: Marvin Su MD  Attending Physician:  MD FINA Gonzalez reports that she has never smoked. She has never used smokeless tobacco. She reports current alcohol use.  She reports that she does not use drugs.        Allergies:     Ace Inhibitors          SWELLING  Dihydrocodeine          HIVES  Codeine [Opioid Sue* Prochlorperazine Edisylate (COMPAZINE) injection 5 mg, 5 mg, Intravenous, Q8H PRN  •  Insulin Aspart Pen (NOVOLOG) 100 UNIT/ML flexpen 2-10 Units, 2-10 Units, Subcutaneous, TID CC and HS  •  melatonin tab 3 mg, 3 mg, Oral, Nightly PRN  •  torsemide (DEMADE (142-209)/(50-73) 161/59  HEENT: Moist mucous membranes. Extraocular muscles are intact. Neck: No swelling, no masses  Respiratory: Non labored, symmetric exursion  Cardiovascular: No irregularities in rhythm  Abdomen: Soft, nontender, nondistended.     Mu congestive heart failure (HCC)     Aortic stenosis, mild     Precordial chest pain     Mixed hyperlipidemia due to type 2 diabetes mellitus ([facility]     Type 2 diabetes mellitus with stage 4 chronic kidney disease, unspecified whether long term insulin use (H kg)  01/11/21 1545 : 184 lb (83.5 kg)  10/09/20 1306 : 186 lb (84.4 kg)     General: Alert and oriented in no apparent distress.   HEENT: No scleral icterus, MMM  Neck: Supple, no SOM or thyromegaly  Cardiac: Regular rate and rhythm, S1, S2 normal, no murmu (MERREM) 500 mg in sodium chloride 0.9% 100 mL IVPB-MBP, 500 mg, Intravenous, Q24H  atorvastatin (LIPITOR) tab 10 mg, 10 mg, Oral, Nightly  Isosorbide Mononitrate ER (IMDUR) 24 hr tab 60 mg, 60 mg, Oral, Daily  glucose (DEX4) oral liquid 15 g, 15 g, Oral, °C)        Intake/Output Summary (Last 24 hours) at 7/17/2021 1131  Last data filed at 7/17/2021 0355      Gross per 24 hour   Intake —   Output 200 ml   Net -200 ml      Last 3 Weights  07/15/21 2032 : 218 lb (98.9 kg)  07/15/21 1253 : 190 lb (86.2 kg)  0 (LIPITOR) tab 10 mg, 10 mg, Oral, Nightly  Isosorbide Mononitrate ER (IMDUR) 24 hr tab 60 mg, 60 mg, Oral, Daily  glucose (DEX4) oral liquid 15 g, 15 g, Oral, Q15 Min PRN   Or  Glucose-Vitamin C (DEX-4) chewable tab 4 tablet, 4 tablet, Oral, Q15 Min PRN CeFAZolin Sodium (ANCEF) 1 g in sodium chloride 0.9% 100 mL IVPB-ADDV     Date Action Dose Route User    7/19/2021 1332 New Bag 1 g Intravenous Jessica Lizama RN      heparin sodium 1000 UNIT/ML injection 1,500 Units     Date Action Dose Route User 175/61Abnormal  100 % — Nasal cannula 2 L/min GB     07/18/21 1752 99 °F (37.2 °C) 70 18 209/73Abnormal  95 % — None (Room air) — AD     07/17/21 2117 98.6 °F (37 °C) 74 18 169/59Abnormal  97 % — None (Room air) — AB

## 2021-07-19 NOTE — PROGRESS NOTES
[facility]  Nephrology Progress Note    Carmela De La Paz Attending:  Oneida Ulrich MD       Assessment and Plan:    1) ESRD- due to longstanding DM / HTN; presents with gross uremia- feeling much better now after HD.  Anticipate permcath in AM; SW to 07/19/2021    ALKPHO 63 07/19/2021    BILT 0.5 07/19/2021    TP 5.9 07/19/2021    AST 12 07/19/2021    ALT 10 07/19/2021    PGLU 75 07/19/2021       Imaging: All imaging studies reviewed.     Meds:   CeFAZolin Sodium (ANCEF) 1 g in sodium chloride 0.9% 100 20 mg, Oral, Daily          Questions/concerns were discussed with patient and/or family by bedside.           Sumit Chavez  7/19/2021  11:21 AM

## 2021-07-19 NOTE — PLAN OF CARE
Pt is A&Ox4. VSS, afebrile. Maintaining O2 sats WDL on RA. 2L NC at [facility]. Tele, NSR. Heparin. EP. Tolerating renal diet, QID accu. Voids- decreased urine output. HD later today (7/19). Up SBA. Denies pain. PIV in left forearm, IV ABX.  WCTM and update pt on Manage/alleviate anxiety  - Utilize distraction and/or relaxation techniques  - Monitor for opioid side effects  - Notify MD/LIP if interventions unsuccessful or patient reports new pain  - Anticipate increased pain with activity and pre-medicate as approp or complex needs related to functional status, cognitive ability or social support system  Outcome: Progressing     Problem: METABOLIC/FLUID AND ELECTROLYTES - ADULT  Goal: Glucose maintained within prescribed range  Description: INTERVENTIONS:  - Monitor

## 2021-07-19 NOTE — PLAN OF CARE
Problem: Diabetes/Glucose Control  Goal: Glucose maintained within prescribed range  Description: INTERVENTIONS:  - Monitor Blood Glucose as ordered  - Assess for signs and symptoms of hyperglycemia and hypoglycemia  - Administer ordered medications to m FOR INFECTION - ADULT  Goal: Absence of fever/infection during anticipated neutropenic period  Description: INTERVENTIONS  - Monitor WBC  - Administer growth factors as ordered  - Implement neutropenic guidelines  Outcome: Progressing     Problem: SAFETY A and symptoms of hyperglycemia and hypoglycemia  - Administer ordered medications to maintain glucose within target range  - Assess barriers to adequate nutritional intake and initiate nutrition consult as needed  - Instruct patient on self management of di

## 2021-07-20 ENCOUNTER — APPOINTMENT (OUTPATIENT)
Dept: INTERVENTIONAL RADIOLOGY/VASCULAR | Facility: HOSPITAL | Age: 66
DRG: 674 | End: 2021-07-20
Attending: INTERNAL MEDICINE
Payer: COMMERCIAL

## 2021-07-20 LAB
ALBUMIN SERPL-MCNC: 2.7 G/DL (ref 3.4–5)
ALBUMIN/GLOB SERPL: 0.8 {RATIO} (ref 1–2)
ALP LIVER SERPL-CCNC: 55 U/L
ALT SERPL-CCNC: 11 U/L
ANION GAP SERPL CALC-SCNC: 7 MMOL/L (ref 0–18)
AST SERPL-CCNC: 13 U/L (ref 15–37)
BASOPHILS # BLD AUTO: 0.03 X10(3) UL (ref 0–0.2)
BASOPHILS NFR BLD AUTO: 0.5 %
BILIRUB SERPL-MCNC: 0.5 MG/DL (ref 0.1–2)
BUN BLD-MCNC: 30 MG/DL (ref 7–18)
BUN/CREAT SERPL: 7.2 (ref 10–20)
CALCIUM BLD-MCNC: 7.7 MG/DL (ref 8.5–10.1)
CHLORIDE SERPL-SCNC: 101 MMOL/L (ref 98–112)
CO2 SERPL-SCNC: 27 MMOL/L (ref 21–32)
CREAT BLD-MCNC: 4.15 MG/DL
DEPRECATED RDW RBC AUTO: 54.4 FL (ref 35.1–46.3)
DEPRECATED RDW RBC AUTO: 54.8 FL (ref 35.1–46.3)
EOSINOPHIL # BLD AUTO: 0.19 X10(3) UL (ref 0–0.7)
EOSINOPHIL NFR BLD AUTO: 3.2 %
ERYTHROCYTE [DISTWIDTH] IN BLOOD BY AUTOMATED COUNT: 15.1 % (ref 11–15)
ERYTHROCYTE [DISTWIDTH] IN BLOOD BY AUTOMATED COUNT: 15.4 % (ref 11–15)
GLOBULIN PLAS-MCNC: 3.2 G/DL (ref 2.8–4.4)
GLUCOSE BLD-MCNC: 100 MG/DL (ref 70–99)
GLUCOSE BLD-MCNC: 108 MG/DL (ref 70–99)
GLUCOSE BLD-MCNC: 165 MG/DL (ref 70–99)
GLUCOSE BLD-MCNC: 88 MG/DL (ref 70–99)
GLUCOSE BLD-MCNC: 94 MG/DL (ref 70–99)
HCT VFR BLD AUTO: 25.1 %
HCT VFR BLD AUTO: 25.2 %
HGB BLD-MCNC: 8 G/DL
HGB BLD-MCNC: 8 G/DL
IMM GRANULOCYTES # BLD AUTO: 0.03 X10(3) UL (ref 0–1)
IMM GRANULOCYTES NFR BLD: 0.5 %
INR BLD: 1.2 (ref 0.89–1.11)
LYMPHOCYTES # BLD AUTO: 0.61 X10(3) UL (ref 1–4)
LYMPHOCYTES NFR BLD AUTO: 10.2 %
M PROTEIN MFR SERPL ELPH: 5.9 G/DL (ref 6.4–8.2)
MCH RBC QN AUTO: 31.7 PG (ref 26–34)
MCH RBC QN AUTO: 31.9 PG (ref 26–34)
MCHC RBC AUTO-ENTMCNC: 31.7 G/DL (ref 31–37)
MCHC RBC AUTO-ENTMCNC: 31.9 G/DL (ref 31–37)
MCV RBC AUTO: 100.4 FL
MCV RBC AUTO: 99.6 FL
MONOCYTES # BLD AUTO: 1.06 X10(3) UL (ref 0.1–1)
MONOCYTES NFR BLD AUTO: 17.8 %
NEUTROPHILS # BLD AUTO: 4.04 X10 (3) UL (ref 1.5–7.7)
NEUTROPHILS # BLD AUTO: 4.04 X10(3) UL (ref 1.5–7.7)
NEUTROPHILS NFR BLD AUTO: 67.8 %
OSMOLALITY SERPL CALC.SUM OF ELEC: 286 MOSM/KG (ref 275–295)
PLATELET # BLD AUTO: 114 10(3)UL (ref 150–450)
PLATELET # BLD AUTO: 118 10(3)UL (ref 150–450)
POTASSIUM SERPL-SCNC: 3.7 MMOL/L (ref 3.5–5.1)
PSA SERPL DL<=0.01 NG/ML-MCNC: 15.6 SECONDS (ref 12.4–14.6)
RBC # BLD AUTO: 2.51 X10(6)UL
RBC # BLD AUTO: 2.52 X10(6)UL
SODIUM SERPL-SCNC: 135 MMOL/L (ref 136–145)
WBC # BLD AUTO: 6 X10(3) UL (ref 4–11)
WBC # BLD AUTO: 6 X10(3) UL (ref 4–11)

## 2021-07-20 PROCEDURE — 99233 SBSQ HOSP IP/OBS HIGH 50: CPT | Performed by: INTERNAL MEDICINE

## 2021-07-20 PROCEDURE — 5A1D70Z PERFORMANCE OF URINARY FILTRATION, INTERMITTENT, LESS THAN 6 HOURS PER DAY: ICD-10-PCS | Performed by: INTERNAL MEDICINE

## 2021-07-20 PROCEDURE — 0JH63XZ INSERTION OF TUNNELED VASCULAR ACCESS DEVICE INTO CHEST SUBCUTANEOUS TISSUE AND FASCIA, PERCUTANEOUS APPROACH: ICD-10-PCS | Performed by: RADIOLOGY

## 2021-07-20 RX ORDER — HEPARIN SODIUM 5000 [USP'U]/ML
INJECTION, SOLUTION INTRAVENOUS; SUBCUTANEOUS
Status: COMPLETED
Start: 2021-07-20 | End: 2021-07-20

## 2021-07-20 RX ORDER — LIDOCAINE HYDROCHLORIDE 10 MG/ML
INJECTION, SOLUTION INFILTRATION; PERINEURAL
Status: COMPLETED
Start: 2021-07-20 | End: 2021-07-20

## 2021-07-20 RX ORDER — AMLODIPINE BESYLATE 5 MG/1
10 TABLET ORAL DAILY
Status: DISCONTINUED | OUTPATIENT
Start: 2021-07-21 | End: 2021-07-22

## 2021-07-20 RX ORDER — LIDOCAINE HYDROCHLORIDE AND EPINEPHRINE 10; 10 MG/ML; UG/ML
INJECTION, SOLUTION INFILTRATION; PERINEURAL
Status: COMPLETED
Start: 2021-07-20 | End: 2021-07-20

## 2021-07-20 RX ORDER — MIDAZOLAM HYDROCHLORIDE 1 MG/ML
INJECTION INTRAMUSCULAR; INTRAVENOUS
Status: COMPLETED
Start: 2021-07-20 | End: 2021-07-20

## 2021-07-20 RX ORDER — HYDRALAZINE HYDROCHLORIDE 25 MG/1
25 TABLET, FILM COATED ORAL EVERY 8 HOURS SCHEDULED
Status: DISCONTINUED | OUTPATIENT
Start: 2021-07-20 | End: 2021-07-20 | Stop reason: DRUGHIGH

## 2021-07-20 RX ORDER — CLONIDINE HYDROCHLORIDE 0.1 MG/1
0.1 TABLET ORAL ONCE
Status: COMPLETED | OUTPATIENT
Start: 2021-07-20 | End: 2021-07-20

## 2021-07-20 RX ORDER — HYDRALAZINE HYDROCHLORIDE 100 MG/1
100 TABLET, FILM COATED ORAL 3 TIMES DAILY
Qty: 90 TABLET | Refills: 11 | Status: SHIPPED | OUTPATIENT
Start: 2021-07-20 | End: 2021-07-21

## 2021-07-20 RX ORDER — CEFAZOLIN SODIUM 1 G/3ML
INJECTION, POWDER, FOR SOLUTION INTRAMUSCULAR; INTRAVENOUS
Status: COMPLETED
Start: 2021-07-20 | End: 2021-07-20

## 2021-07-20 RX ORDER — HYDRALAZINE HYDROCHLORIDE 50 MG/1
100 TABLET, FILM COATED ORAL EVERY 8 HOURS SCHEDULED
Status: DISCONTINUED | OUTPATIENT
Start: 2021-07-20 | End: 2021-07-21

## 2021-07-20 RX ORDER — AMLODIPINE BESYLATE 10 MG/1
10 TABLET ORAL DAILY
Qty: 30 TABLET | Refills: 11 | Status: SHIPPED | OUTPATIENT
Start: 2021-07-21 | End: 2021-08-09

## 2021-07-20 NOTE — PAYOR COMM NOTE
--------------  CONTINUED STAY REVIEW-------REQUESTING ADDITIONAL DAY 7/20      Payor: [employer] [facility]  Subscriber #:  [id number]  Authorization Number: [id number]    Admit date: 7/15/21  Admit time:  7:45 PM    Admitting Physician: Ioana Agarwal MD  Attend 07/20/21  0809   * 66* 88   BUN 36* 46* 30*   CREATSERUM 5.25* 6.44* 4.15*   GFRAA 9* 7* 12*   GFRNAA 8* 6* 11*   CA 8.0* 8.1* 7.7*   ALB 2.6* 2.7* 2.7*   * 135* 135*   K 3.9 4.1 3.7    101 101   CO2 25.0 26.0 27.0   ALKPHO 68 63 55   AS antibiotics  Stable on cefazolin, complete abx in am        Judene Landau, MD [facility]         MEDICATIONS ADMINISTERED IN LAST 1 DAY:  amLODIPine Besylate (NORVASC) tab 5 mg     Date Action Dose Route User    7/20/2021 255

## 2021-07-20 NOTE — PROGRESS NOTES
[facility]  Nephrology Progress Note    Darline Crimes Attending:  Princess Avani MD       Assessment and Plan:    1) ESRD- due to longstanding DM / HTN; presents with gross uremia- feeling much better now after HD.  Permcath placement today    2) HT reviewed.     Meds:   CeFAZolin Sodium (ANCEF) 1 g in sodium chloride 0.9% 100 mL IVPB-ADDV, 1 g, Intravenous, Q24H  losartan (COZAAR) tab 100 mg, 100 mg, Oral, Daily  amLODIPine Besylate (NORVASC) tab 5 mg, 5 mg, Oral, Daily  Pantoprazole Sodium (PROTONIX)

## 2021-07-20 NOTE — PLAN OF CARE
Pt is A&Ox4. Blood pressure was elevated overnight- hydralazine given per MAR, afebrile. Maintaining O2 sats WDL on RA. 2L NC at [place]. Tele, NSR. Heparin. EP. Last BM 7/18. NPO since midnight for permacath placement today (7/20).  Voids- decreased urine outp appropriate and evaluate response  - Consider cultural and social influences on pain and pain management  - Manage/alleviate anxiety  - Utilize distraction and/or relaxation techniques  - Monitor for opioid side effects  - Notify MD/LIP if interventions un for coordinating discharge planning if the patient needs post-hospital services based on physician/LIP order or complex needs related to functional status, cognitive ability or social support system  Outcome: Progressing     Problem: METABOLIC/FLUID AND EL

## 2021-07-20 NOTE — PROGRESS NOTES
Pt returned from procedure. Orders for resuning diet and bedrest for 3 hours. Pt tired, not hungry at this time. Ok with plan of care. NSR on tele, Heart rate 65. /77. Pt will stay another night and have HD tomorrow. Will continue to monitor.

## 2021-07-20 NOTE — DIETARY NOTE
[address]     Admitting diagnosis:  Hyponatremia [I12.9]  Metabolic acidosis [R71.5]  Acute renal failure superimposed on chronic kidney disease, unspecified CKD stage, unspecified acute renal failure type ([facility]

## 2021-07-20 NOTE — PROGRESS NOTES
[facility]                INFECTIOUS DISEASE PROGRESS NOTE    [facility] Patient Status:  Inpatient    8/10/1955 MRN [id number]   [facility] 5NW-A Attending Maykel Almanzar MD   Hosp Day # 5 PCP Chantal Bell MD     [facility] [address] Encounter on 07/15/21   1. Urine Culture, Routine     Status: None    Collection Time: 07/16/21  5:51 AM    Specimen: Urine, clean catch   Result Value Ref Range    Urine Culture No Growth at 18-24 hrs. N/A   2.  Blood Cultur

## 2021-07-20 NOTE — PROCEDURES
[address] [place] [address] Patient Status:  Inpatient    8/10/1955 MRN [id number]   Location [address] [facility] Attending Johnathon Jarvis MD   Hosp Day # 5 PCP Beatriz Hogan MD         Brief Procedure Report    Pre-Operative

## 2021-07-20 NOTE — CM/SW NOTE
Page to dr Alok Berman to [facility] order for bsc. New HD schedule letter received with requested chair time of 330, MWF, to start 7/21.     Cary Pimentel RN,   [id number]

## 2021-07-20 NOTE — PLAN OF CARE
Problem: Diabetes/Glucose Control  Goal: Glucose maintained within prescribed range  Description: INTERVENTIONS:  - Monitor Blood Glucose as ordered  - Assess for signs and symptoms of hyperglycemia and hypoglycemia  - Administer ordered medications to m appropriate  Outcome: Progressing     Problem: RISK FOR INFECTION - ADULT  Goal: Absence of fever/infection during anticipated neutropenic period  Description: INTERVENTIONS  - Monitor WBC  - Administer growth factors as ordered  - Implement neutropenic gu Monitor Blood Glucose as ordered  - Assess for signs and symptoms of hyperglycemia and hypoglycemia  - Administer ordered medications to maintain glucose within target range  - Assess barriers to adequate nutritional intake and initiate nutrition consult a

## 2021-07-20 NOTE — PROCEDURES
[facility]  Pre-Procedure Note    Name: Donell Steven  MRN#: [id number]  : 8/10/1955    Procedure:  Ultrasound and Fluoro Guided Tunneled Permanent Dialysis Catheter Placement    Indication: ESRD requiring HD    Allergies:       Ace Inhibitors

## 2021-07-20 NOTE — PROGRESS NOTES
[facility]  Progress Note    Maryse Hogan Patient Status:  Inpatient    8/10/1955 MRN [id number]   [facility] 5NW-A Attending Selene Walker MD   Hosp Day # 5 PCP Moustapha Price MD       SUBJECTIVE:  No CP, SOB, or N/V.  feellng in sodium chloride 0.9% 100 mL IVPB-ADDV, 1 g, Intravenous, Q24H  losartan (COZAAR) tab 100 mg, 100 mg, Oral, Daily  Pantoprazole Sodium (PROTONIX) EC tab 40 mg, 40 mg, Oral, QAM AC  heparin sodium 1000 UNIT/ML injection 1,500 Units, 1.5 mL, Intracatheter, diastolic congestive heart failure (HCC)     Aortic stenosis, mild     Precordial chest pain     Mixed hyperlipidemia due to type 2 diabetes mellitus ([facility]     Type 2 diabetes mellitus with stage 4 chronic kidney disease, unspecified whether long term insu

## 2021-07-21 ENCOUNTER — APPOINTMENT (OUTPATIENT)
Dept: NUCLEAR MEDICINE | Facility: HOSPITAL | Age: 66
DRG: 674 | End: 2021-07-21
Attending: FAMILY MEDICINE
Payer: COMMERCIAL

## 2021-07-21 ENCOUNTER — APPOINTMENT (OUTPATIENT)
Dept: CV DIAGNOSTICS | Facility: HOSPITAL | Age: 66
DRG: 674 | End: 2021-07-21
Attending: FAMILY MEDICINE
Payer: COMMERCIAL

## 2021-07-21 LAB
ALBUMIN SERPL-MCNC: 2.3 G/DL (ref 3.4–5)
ALBUMIN/GLOB SERPL: 0.7 {RATIO} (ref 1–2)
ALP LIVER SERPL-CCNC: 58 U/L
ALT SERPL-CCNC: 8 U/L
ANION GAP SERPL CALC-SCNC: 6 MMOL/L (ref 0–18)
AST SERPL-CCNC: 13 U/L (ref 15–37)
ATRIAL RATE: 64 BPM
BILIRUB SERPL-MCNC: 0.4 MG/DL (ref 0.1–2)
BUN BLD-MCNC: 38 MG/DL (ref 7–18)
BUN/CREAT SERPL: 7.5 (ref 10–20)
CALCIUM BLD-MCNC: 7.8 MG/DL (ref 8.5–10.1)
CHLORIDE SERPL-SCNC: 101 MMOL/L (ref 98–112)
CO2 SERPL-SCNC: 27 MMOL/L (ref 21–32)
CREAT BLD-MCNC: 5.04 MG/DL
D-DIMER: 2.46 UG/ML FEU (ref ?–0.64)
GLOBULIN PLAS-MCNC: 3.3 G/DL (ref 2.8–4.4)
GLUCOSE BLD-MCNC: 105 MG/DL (ref 70–99)
GLUCOSE BLD-MCNC: 191 MG/DL (ref 70–99)
GLUCOSE BLD-MCNC: 210 MG/DL (ref 70–99)
GLUCOSE BLD-MCNC: 220 MG/DL (ref 70–99)
GLUCOSE BLD-MCNC: 97 MG/DL (ref 70–99)
HAV IGM SER QL: 2.1 MG/DL (ref 1.6–2.6)
M PROTEIN MFR SERPL ELPH: 5.6 G/DL (ref 6.4–8.2)
OSMOLALITY SERPL CALC.SUM OF ELEC: 287 MOSM/KG (ref 275–295)
P AXIS: 49 DEGREES
P-R INTERVAL: 154 MS
POTASSIUM SERPL-SCNC: 3.7 MMOL/L (ref 3.5–5.1)
Q-T INTERVAL: 442 MS
QRS DURATION: 76 MS
QTC CALCULATION (BEZET): 455 MS
R AXIS: 46 DEGREES
SODIUM SERPL-SCNC: 134 MMOL/L (ref 136–145)
T AXIS: 28 DEGREES
TROPONIN I SERPL-MCNC: <0.045 NG/ML (ref ?–0.04)
VENTRICULAR RATE: 64 BPM

## 2021-07-21 PROCEDURE — 99233 SBSQ HOSP IP/OBS HIGH 50: CPT | Performed by: INTERNAL MEDICINE

## 2021-07-21 PROCEDURE — 93306 TTE W/DOPPLER COMPLETE: CPT | Performed by: FAMILY MEDICINE

## 2021-07-21 PROCEDURE — 78582 LUNG VENTILAT&PERFUS IMAGING: CPT | Performed by: FAMILY MEDICINE

## 2021-07-21 RX ORDER — CEPHALEXIN 500 MG/1
500 CAPSULE ORAL DAILY
Qty: 3 CAPSULE | Refills: 0 | Status: SHIPPED | OUTPATIENT
Start: 2021-07-21 | End: 2021-07-24

## 2021-07-21 RX ORDER — CYCLOBENZAPRINE HCL 10 MG
10 TABLET ORAL 3 TIMES DAILY PRN
Status: DISCONTINUED | OUTPATIENT
Start: 2021-07-21 | End: 2021-07-22

## 2021-07-21 NOTE — PLAN OF CARE
Pt is admitted for abdominal pain and vomiting. Pt is AOx4, VSS, afebrile and denies any pain. SpO2 maintained on RA, 2L at night. . Tele- NSR. Noncardiac EP. Heparin. Reg diet and QID ACCU check. Voids, oligouric.  HD scheduled on 7/21, [facility] non-pharmacological measures as appropriate and evaluate response  - Consider cultural and social influences on pain and pain management  - Manage/alleviate anxiety  - Utilize distraction and/or relaxation techniques  - Monitor for opioid side effects  - N Refer to Case Management Department for coordinating discharge planning if the patient needs post-hospital services based on physician/LIP order or complex needs related to functional status, cognitive ability or social support system  Outcome: Progressing

## 2021-07-21 NOTE — CONSULTS
Rian Clark [address] [facility] [address] Note [facility]  Report of Consultation    Kristin Pavon Patient Status:  Inpatient    8/10/1955 MRN [id number]   [facility] 5NW-A Attending Carole Sosa Mother    • Other (Other) Mother         hypothyroid   • Diabetes Sister    • Hypertension Sister       reports that she has never smoked. She has never used smokeless tobacco. She reports current alcohol use. She reports that she does not use drugs.     Al skin lesions, and pruritus. Hematologic/lymphatic: Negative for easy bruising, bleeding, and lymphadenopathy. Musculoskeletal: Negative for myalgias, arthralgias, muscle weakness.   Neurological: Negative for headaches, dizziness, seizures, memory problem GLU 66* 88 97   BUN 46* 30* 38*   CREATSERUM 6.44* 4.15* 5.04*   GFRAA 7* 12* 10*   GFRNAA 6* 11* 8*   CA 8.1* 7.7* 7.8*   * 135* 134*   K 4.1 3.7 3.7    101 101   CO2 26.0 27.0 27.0     Recent Labs   Lab 07/18/21  0706 07/19/21  0713 07/20/2 Chest imaging reviewed      ASSESSMENT  · Worsening renal failure, ESRD now s/p permacath with plans to start HD  · Poorly controlled HTN  · HFpEF, pulmonary HTN on echo  · Nocturnal desaturation, possible MARIA FERNANDA, but given her renal failure, CHF and pul

## 2021-07-21 NOTE — CM/SW NOTE
Call to [facility] to inform them that patient will start outpatient on Friday 7/23.     Lindsay Mittal RN,   [id number]

## 2021-07-21 NOTE — PROGRESS NOTES
Patient alert and oriented x4. VSS. Afebrile. Tele NSR. Short period of A flutter on shift. EKG and ECHO done. Cards consulted. Continue to monitor as back in NSR now. : RA during the day. 2L overnight for MARIA FERNANDA possible.   Tonight to complete MARIA FERNANDA scree

## 2021-07-21 NOTE — PROGRESS NOTES
[facility]  Progress Note    Belen aWn Patient Status:  Inpatient    8/10/1955 MRN [id number]   [facility] 5NW-A Attending Melissa Duffy MD   Hosp Day # 6 PCP Rome Dos Santos MD       SUBJECTIVE:  No CP, SOB, or N/V.   Improvin Units, 10,000 Units, Intravenous, Once in dialysis  CeFAZolin Sodium (ANCEF) 1 g in sodium chloride 0.9% 100 mL IVPB-ADDV, 1 g, Intravenous, Q24H  losartan (COZAAR) tab 100 mg, 100 mg, Oral, Daily  Pantoprazole Sodium (PROTONIX) EC tab 40 mg, 40 mg, Oral, stage 4 and hypertension (HCC)     Hypertensive heart disease with chronic diastolic congestive heart failure (HCC)     Aortic stenosis, mild     Precordial chest pain     Mixed hyperlipidemia due to type 2 diabetes mellitus ([facility]     Type 2 diabetes janay

## 2021-07-21 NOTE — PROGRESS NOTES
[facility]  Nephrology Progress Note    Keanu Hines Attending:  Nas Guerrero MD       Assessment and Plan:    1) ESRD- due to longstanding DM / HTN; presents with gross uremia- feeling much better now after HD.  Permcath placement today    2) HT studies reviewed.     Meds:   amLODIPine Besylate (NORVASC) tab 10 mg, 10 mg, Oral, Daily  hydrALAzine HCl (APRESOLINE) tab 100 mg, 100 mg, Oral, Q8H IVORY  epoetin trisha-epbx (RETACRIT) 90428 UNIT/ML injection 10,000 Units, 10,000 Units, Intravenous, Once in bedside.           Oliver Valle MD  7/21/2021  921 AM Xray Chest 1 View- PORTABLE-Routine

## 2021-07-22 VITALS
BODY MASS INDEX: 35.51 KG/M2 | SYSTOLIC BLOOD PRESSURE: 148 MMHG | HEART RATE: 62 BPM | RESPIRATION RATE: 18 BRPM | OXYGEN SATURATION: 91 % | TEMPERATURE: 99 F | WEIGHT: 213.13 LBS | DIASTOLIC BLOOD PRESSURE: 64 MMHG | HEIGHT: 65 IN

## 2021-07-22 LAB
ALBUMIN SERPL-MCNC: 2.6 G/DL (ref 3.4–5)
ALBUMIN/GLOB SERPL: 0.7 {RATIO} (ref 1–2)
ALP LIVER SERPL-CCNC: 63 U/L
ALT SERPL-CCNC: 7 U/L
ANION GAP SERPL CALC-SCNC: 5 MMOL/L (ref 0–18)
AST SERPL-CCNC: 14 U/L (ref 15–37)
BASOPHILS # BLD AUTO: 0.03 X10(3) UL (ref 0–0.2)
BASOPHILS NFR BLD AUTO: 0.5 %
BILIRUB SERPL-MCNC: 0.4 MG/DL (ref 0.1–2)
BUN BLD-MCNC: 23 MG/DL (ref 7–18)
BUN/CREAT SERPL: 6.2 (ref 10–20)
CALCIUM BLD-MCNC: 8.1 MG/DL (ref 8.5–10.1)
CHLORIDE SERPL-SCNC: 101 MMOL/L (ref 98–112)
CO2 SERPL-SCNC: 28 MMOL/L (ref 21–32)
CREAT BLD-MCNC: 3.69 MG/DL
DEPRECATED RDW RBC AUTO: 56.6 FL (ref 35.1–46.3)
EOSINOPHIL # BLD AUTO: 0.16 X10(3) UL (ref 0–0.7)
EOSINOPHIL NFR BLD AUTO: 2.9 %
ERYTHROCYTE [DISTWIDTH] IN BLOOD BY AUTOMATED COUNT: 15.3 % (ref 11–15)
GLOBULIN PLAS-MCNC: 3.7 G/DL (ref 2.8–4.4)
GLUCOSE BLD-MCNC: 106 MG/DL (ref 70–99)
GLUCOSE BLD-MCNC: 190 MG/DL (ref 70–99)
GLUCOSE BLD-MCNC: 93 MG/DL (ref 70–99)
HCT VFR BLD AUTO: 24.7 %
HGB BLD-MCNC: 7.4 G/DL
IMM GRANULOCYTES # BLD AUTO: 0.03 X10(3) UL (ref 0–1)
IMM GRANULOCYTES NFR BLD: 0.5 %
LYMPHOCYTES # BLD AUTO: 0.62 X10(3) UL (ref 1–4)
LYMPHOCYTES NFR BLD AUTO: 11.3 %
M PROTEIN MFR SERPL ELPH: 6.3 G/DL (ref 6.4–8.2)
MCH RBC QN AUTO: 30.8 PG (ref 26–34)
MCHC RBC AUTO-ENTMCNC: 30 G/DL (ref 31–37)
MCV RBC AUTO: 102.9 FL
MONOCYTES # BLD AUTO: 0.71 X10(3) UL (ref 0.1–1)
MONOCYTES NFR BLD AUTO: 12.9 %
NEUTROPHILS # BLD AUTO: 3.94 X10 (3) UL (ref 1.5–7.7)
NEUTROPHILS # BLD AUTO: 3.94 X10(3) UL (ref 1.5–7.7)
NEUTROPHILS NFR BLD AUTO: 71.9 %
OSMOLALITY SERPL CALC.SUM OF ELEC: 281 MOSM/KG (ref 275–295)
PLATELET # BLD AUTO: 116 10(3)UL (ref 150–450)
POTASSIUM SERPL-SCNC: 3.4 MMOL/L (ref 3.5–5.1)
RBC # BLD AUTO: 2.4 X10(6)UL
SODIUM SERPL-SCNC: 134 MMOL/L (ref 136–145)
WBC # BLD AUTO: 5.5 X10(3) UL (ref 4–11)

## 2021-07-22 PROCEDURE — 99233 SBSQ HOSP IP/OBS HIGH 50: CPT | Performed by: INTERNAL MEDICINE

## 2021-07-22 NOTE — PROGRESS NOTES
NURSING DISCHARGE NOTE    Discharged Home via Wheelchair. Accompanied by Family member  Belongings Taken by patient/family. Patient discharged home with mother. Transported by sister. Home O2 set up with sleep by HME.  Patient with referrals to [facility]

## 2021-07-22 NOTE — PLAN OF CARE
Problem: Diabetes/Glucose Control  Goal: Glucose maintained within prescribed range  Description: INTERVENTIONS:  - Monitor Blood Glucose as ordered  - Assess for signs and symptoms of hyperglycemia and hypoglycemia  - Administer ordered medications to m techniques  - Monitor for opioid side effects  - Notify MD/LIP if interventions unsuccessful or patient reports new pain  - Anticipate increased pain with activity and pre-medicate as appropriate  Outcome: Adequate for Discharge     Problem: RISK FOR INFEC functional status, cognitive ability or social support system  Outcome: Adequate for Discharge     Problem: METABOLIC/FLUID AND ELECTROLYTES - ADULT  Goal: Glucose maintained within prescribed range  Description: INTERVENTIONS:  - Monitor Blood Glucose as

## 2021-07-22 NOTE — CM/SW NOTE
07/20/21 1100   Discharge disposition   Expected discharge disposition Home or Self   Name of Facillity/Home Care/Hospice Home   Outpatient services Dialysis  ([facility])   HME provider [facility]  [facility] and o2)   Discharge transportation

## 2021-07-22 NOTE — PROGRESS NOTES
07/22/21 1036   Mobility   O2 walk?  Yes   SPO2% on Room Air at Rest 97   SPO2% Ambulation on Room Air 93

## 2021-07-22 NOTE — PROGRESS NOTES
07/22/21 0906 07/22/21 0907 07/22/21 0909   Oxygen Therapy   SpO2 (!) 88 % (!) 88 % (!) 88 %   O2 Device None (Room air) None (Room air) None (Room air)   O2 Flow Rate (L/min)  --   --   --       07/22/21 0916 07/22/21 0953 07/22/21 1000   Oxygen Therap

## 2021-07-22 NOTE — PROGRESS NOTES
[facility] Pulmonary/Critical Care Progress Note     SUBJECTIVE/Interval history: All events, procedures, notes reviewed. Reported desaturation overnight. She feels okay today. No fevers, cough or dyspnea.  No pain    Re 28.0     Recent Labs   Lab 07/19/21  0713 07/20/21  0651 07/22/21  0613   RBC 2.40* 2.51*  2.52* 2.40*   HGB 7.5* 8.0*  8.0* 7.4*   HCT 24.8* 25.2*  25.1* 24.7*   .3* 100.4*  99.6 102.9*   MCH 31.3 31.9  31.7 30.8   MCHC 30.2* 31.7  31.9 30.0*   RDW Finalized by (CST): Christiano Genao MD on 7/20/2021 at 6:59 PM         • amLODIPine Besylate  10 mg Oral Daily   • ceFAZolin  1 g Intravenous Q24H   • Losartan Potassium  100 mg Oral Daily   • Pantoprazole Sodium  40 mg Oral QAM AC   • carvedilol  50

## 2021-07-22 NOTE — CONSULTS
[facility] Cardiology Consultation    Catalina Maynor Patient Status:  Inpatient    8/10/1955 MRN [id number]   [facility] 5NW-A Attending Jaci Martinez MD   Hosp Day # 7 PCP Gordo Ferrera MD     Reason for Consultation:  tachycardia      Hist (Porcine)  5,000 Units Subcutaneous 2 times per day   • Insulin Aspart Pen  2-10 Units Subcutaneous TID CC and HS   • torsemide  20 mg Oral Daily       Continuous Infusions:      Social History:   reports that she has never smoked.  She has never used smoke 5.25* 6.44* 4.15* 5.04*  --  3.69*   CA 8.0*   < > 8.0*   < > 8.0* 8.1* 7.7* 7.8*  --  8.1*   MG 2.6  --  2.3  --   --   --   --   --  2.1  --    GLU 95   < > 146*   < > 105* 66* 88 97  --  93    < > = values in this interval not displayed.        Recent [place]

## 2021-07-22 NOTE — PROGRESS NOTES
[facility]  Nephrology Progress Note    Lorraine Posadas Attending:  Marito Denis MD       Assessment and Plan:    1) ESRD- due to longstanding DM / HTN; presents with gross uremia- feeling much better now after HD    2) HTN- should improve with HD 134 07/22/2021    K 3.4 07/22/2021     07/22/2021    CO2 28.0 07/22/2021    GLU 93 07/22/2021    CA 8.1 07/22/2021    ALB 2.6 07/22/2021    ALKPHO 63 07/22/2021    BILT 0.4 07/22/2021    TP 6.3 07/22/2021    AST 14 07/22/2021    ALT 7 07/22/2021    D tab 3 mg, 3 mg, Oral, Nightly PRN  torsemide (DEMADEX) tab 20 mg, 20 mg, Oral, Daily          Questions/concerns were discussed with patient and/or family by bedside.           Ira Vuong MD  7/22/2021  1051 AM

## 2021-07-22 NOTE — PLAN OF CARE
Pt is admitted for abdominal pain and vomiting. Pt is AOx4, VSS, afebrile and report moderate pain and gave Tylenol. BP elevated and gave PRN Hydralazine. Overnight Oximetry study completed between 0130 to 0140.  Pt desat ed on RA more than 5 mins within Encourage pt to monitor pain and request assistance  - Assess pain using appropriate pain scale  - Administer analgesics based on type and severity of pain and evaluate response  - Implement non-pharmacological measures as appropriate and evaluate response Consider post-discharge preferences of patient/family/discharge partner  - Complete POLST form as appropriate  - Assess patient's ability to be responsible for managing their own health  - Refer to Case Management Department for coordinating discharge plan Progressing

## 2021-07-22 NOTE — CM/SW NOTE
Call from rn, patient requesting h[facility] rn w/[facility]. Referral sent via aidin.     Eugene Norman RN,   [id number]

## 2021-07-22 NOTE — PAYOR COMM NOTE
--------------  CONTINUED STAY REVIEW----REQUESTING ADDITIONAL DAY 7/21      Payor: [employer] [address] [employer] PPO  Subscriber #:  [id number]  Authorization Number: [id number]    Admit date: 7/15/21  Admit time:  7:45 PM    Admitting Physician: Hussein Wiley MD  Attending  07/21/2021     K 3.7 07/21/2021      07/21/2021     CO2 27.0 07/21/2021     GLU 97 07/21/2021     CA 7.8 07/21/2021     ALB 2.3 07/21/2021     ALKPHO 58 07/21/2021     BILT 0.4 07/21/2021     TP 5.6 07/21/2021     AST 13 07/21/2021     ALT 8 0 5 mg, Intravenous, Q8H PRN  Insulin Aspart Pen (NOVOLOG) 100 UNIT/ML flexpen 2-10 Units, 2-10 Units, Subcutaneous, TID CC and HS  melatonin tab 3 mg, 3 mg, Oral, Nightly PRN  torsemide (DEMADEX) tab 20 mg, 20 mg, Oral, Daily            Talha Bunn MD  7 hydrALAzine HCl (APRESOLINE) injection 10 mg     Date Action Dose Route User    7/22/2021 0510 Given 10 mg Intravenous Riaz Kennedy RN    7/22/2021 0054 Given 10 mg Intravenous Riaz Kennedy RN      Insulin Aspart Pen (NOVOLOG) 100 UNIT/ML f

## 2021-07-22 NOTE — PROGRESS NOTES
[facility]  Progress Note    Ree Many Patient Status:  Inpatient    8/10/1955 MRN [id number]   [facility] 5NW-A Attending Paxton Jacobs MD   Hosp Day # 6 PCP Luann Ellis MD       SUBJECTIVE:  No CP, SOB, or N/V.  Episode AC  heparin sodium 1000 UNIT/ML injection 1,500 Units, 1,500 Units, Intravenous, PRN Dialysis  Albumin Human (ALBUMINAR) 25 % solution 100 mL, 100 mL, Intravenous, PRN  carvedilol (COREG) tab 50 mg, 50 mg, Oral, BID with meals  hydrALAzine HCl (APRESOLINE) ([facility] [address]     Abnormal nuclear stress test     Acute renal failure superimposed on chronic kidney disease ([facility] [address]     Acute renal failure superimposed on chronic kidney disease, unspecified CKD stage, unspecified acute renal failure type (HCC)     Metabolic acid

## 2021-07-22 NOTE — CM/SW NOTE
Referral for home o2 called into Eulalio Burch at [facility] await approval.    Cary Pimentel RN,   [id number]

## 2021-07-23 NOTE — PAYOR COMM NOTE
--------------  DISCHARGE REVIEW    Payor: MINGO VALENTE  Subscriber #:  [id number]  Authorization Number: [id number]    Admit date: 7/15/21  Admit time:   7:45 PM  Discharge Date: 7/22/2021  4:37 PM     Admitting Physician: Sherlynn Cheadle, MD  Attending Ph

## 2021-07-23 NOTE — PROGRESS NOTES
[facility]  Progress Note    Shelva Second Patient Status:  Inpatient    8/10/1955 MRN [id number]   [facility] 5NW-A Attending No att. providers found   [address] Day # 7 PCP Gucci Gramajo MD       SUBJECTIVE:  No CP, SOB, or N/V.   Feels nuclear stress test     Acute renal failure superimposed on chronic kidney disease ([facility] [address]     Acute renal failure superimposed on chronic kidney disease, unspecified CKD stage, unspecified acute renal failure type ([facility] [address]     Metabolic acidosis     Hyponatrem

## 2021-07-23 NOTE — CM/SW NOTE
[facility] unable to accept patient. Call to patient [phone] to discuss, left voice mail w/call back information.     Andres Llamas RN,   [id number]

## 2021-08-20 ENCOUNTER — TELEPHONE (OUTPATIENT)
Dept: NEPHROLOGY | Facility: CLINIC | Age: 66
End: 2021-08-20

## 2021-08-20 RX ORDER — CARVEDILOL 25 MG/1
50 TABLET ORAL 2 TIMES DAILY WITH MEALS
Qty: 360 TABLET | Refills: 3 | Status: SHIPPED | OUTPATIENT
Start: 2021-08-20

## 2021-09-09 NOTE — DISCHARGE SUMMARY
[facility]  Discharge Summary    Dameon Flatness Patient Status:  Inpatient    8/10/1955 MRN [id number]   [facility] 5NW-A Attending No att. providers found   [facility] Day # 7 PCP Wendy De La Fuente MD     Date of Admission: 7/15/2021  2:3 prn  7. Edema   8. Poss UTI  Cover with atb and pan culture procalcitonin ordered neg antibiotics per renal  9.  Elevated lipase will follow may need GI likely secondary to vomiting     Nausea and vomiting has resolved with ppi   Cont  Dialysis   New cath p 100 MG Oral Tab    minoxidil 2.5 MG Oral Tab    hydrALAZINE HCl 100 MG Oral Tab    cloNIDine HCl 0.1 MG Oral Tab    cloNIDine HCl ER 0.1 MG Oral Tablet 12 Hr    FERROUS SULFATE OR    patiromer (VELTASSA) 8.4 g Oral Powd Pack          Follow up Visits:  Zackary Cooks

## 2021-10-15 ENCOUNTER — ORDER TRANSCRIPTION (OUTPATIENT)
Dept: SLEEP CENTER | Age: 66
End: 2021-10-15

## 2021-10-15 DIAGNOSIS — G47.33 OSA (OBSTRUCTIVE SLEEP APNEA): Primary | ICD-10-CM

## 2021-11-10 ENCOUNTER — OFFICE VISIT (OUTPATIENT)
Dept: SLEEP CENTER | Age: 66
End: 2021-11-10
Attending: INTERNAL MEDICINE
Payer: COMMERCIAL

## 2021-11-10 DIAGNOSIS — G47.33 OSA (OBSTRUCTIVE SLEEP APNEA): ICD-10-CM

## 2021-11-10 PROCEDURE — 95810 POLYSOM 6/> YRS 4/> PARAM: CPT

## 2022-01-04 ENCOUNTER — ORDER TRANSCRIPTION (OUTPATIENT)
Dept: SLEEP CENTER | Age: 67
End: 2022-01-04

## 2022-01-04 DIAGNOSIS — Z01.818 PREOP EXAMINATION: Primary | ICD-10-CM

## 2022-01-04 DIAGNOSIS — G47.33 OSA (OBSTRUCTIVE SLEEP APNEA): Primary | ICD-10-CM

## 2022-01-04 DIAGNOSIS — Z11.59 SCREENING FOR VIRAL DISEASE: ICD-10-CM

## 2022-01-09 ENCOUNTER — LAB ENCOUNTER (OUTPATIENT)
Dept: LAB | Facility: HOSPITAL | Age: 67
End: 2022-01-09
Attending: INTERNAL MEDICINE

## 2022-01-09 DIAGNOSIS — Z01.818 PREOP EXAMINATION: ICD-10-CM

## 2022-01-09 DIAGNOSIS — Z11.59 SCREENING FOR VIRAL DISEASE: ICD-10-CM

## 2022-01-10 LAB — SARS-COV-2 RNA RESP QL NAA+PROBE: DETECTED

## 2022-01-30 ENCOUNTER — OFFICE VISIT (OUTPATIENT)
Dept: SLEEP CENTER | Age: 67
End: 2022-01-30
Attending: Other
Payer: MEDICARE

## 2022-01-30 DIAGNOSIS — G47.33 OSA (OBSTRUCTIVE SLEEP APNEA): ICD-10-CM

## 2022-01-30 PROCEDURE — 95811 POLYSOM 6/>YRS CPAP 4/> PARM: CPT

## 2022-03-25 ENCOUNTER — MED REC SCAN ONLY (OUTPATIENT)
Dept: NEPHROLOGY | Facility: CLINIC | Age: 67
End: 2022-03-25

## 2022-09-06 ENCOUNTER — HOSPITAL ENCOUNTER (OUTPATIENT)
Dept: CV DIAGNOSTICS | Facility: HOSPITAL | Age: 67
Discharge: HOME | End: 2022-09-06
Attending: INTERNAL MEDICINE
Payer: MEDICARE

## 2022-09-06 DIAGNOSIS — I27.20 PULMONARY HTN (HCC): ICD-10-CM

## 2022-09-06 PROCEDURE — 93306 TTE W/DOPPLER COMPLETE: CPT | Performed by: INTERNAL MEDICINE

## 2022-09-29 ENCOUNTER — OFFICE VISIT (OUTPATIENT)
Facility: CLINIC | Age: 67
End: 2022-09-29

## 2022-09-29 VITALS
BODY MASS INDEX: 32.27 KG/M2 | HEIGHT: 64 IN | RESPIRATION RATE: 16 BRPM | DIASTOLIC BLOOD PRESSURE: 64 MMHG | WEIGHT: 189 LBS | SYSTOLIC BLOOD PRESSURE: 160 MMHG | HEART RATE: 56 BPM | OXYGEN SATURATION: 98 %

## 2022-09-29 DIAGNOSIS — G47.33 OSA (OBSTRUCTIVE SLEEP APNEA): Primary | ICD-10-CM

## 2022-09-29 PROCEDURE — 99214 OFFICE O/P EST MOD 30 MIN: CPT | Performed by: INTERNAL MEDICINE

## 2022-09-29 RX ORDER — HEPARIN SOD,PORCINE/0.9 % NACL 100/100 ML
1000 INTRAVENOUS SOLUTION INTRAVENOUS AS DIRECTED
COMMUNITY
Start: 2022-07-27 | End: 2023-07-26

## 2022-09-29 RX ORDER — GLIPIZIDE 5 MG/1
5 TABLET ORAL EVERY MORNING
COMMUNITY
Start: 2022-09-13

## 2022-09-29 RX ORDER — HYDRALAZINE HYDROCHLORIDE 50 MG/1
100 TABLET, FILM COATED ORAL 3 TIMES DAILY
COMMUNITY
Start: 2022-09-09

## 2022-09-29 RX ORDER — LOSARTAN POTASSIUM 100 MG/1
100 TABLET ORAL DAILY
COMMUNITY
Start: 2022-09-09

## 2022-09-29 RX ORDER — HEPARIN SOD,PORCINE/0.9 % NACL 100/100 ML
INTRAVENOUS SOLUTION INTRAVENOUS
COMMUNITY
Start: 2022-06-08 | End: 2023-06-07

## 2022-09-29 RX ORDER — ERGOCALCIFEROL (VITAMIN D2) 1250 MCG
CAPSULE ORAL
COMMUNITY
Start: 2021-08-11

## 2022-09-29 RX ORDER — PRAZOSIN HYDROCHLORIDE 5 MG/1
5 CAPSULE ORAL NIGHTLY
COMMUNITY
Start: 2022-05-10

## 2022-09-29 RX ORDER — PRAZOSIN HYDROCHLORIDE 1 MG/1
2 CAPSULE ORAL NIGHTLY
COMMUNITY

## 2022-09-29 NOTE — PATIENT INSTRUCTIONS
-continue good work with machine   - to get overnight oximetry - on the cpap machine - call if no word in 2 weeks   - call with any issues   - see me in 5-6 months

## 2022-10-08 NOTE — CM/SW NOTE
Order noted for 3-1 commode. Awaiting co signature from MD. Referral called into [employer] of [address] [place] [address]. sw following. ADDENDUM:  Yessica from [address] [place] [address] notified this worker that 3-1 commode is approved and they will contact pt/family for delivery. no

## 2022-11-03 ENCOUNTER — TELEPHONE (OUTPATIENT)
Facility: CLINIC | Age: 67
End: 2022-11-03

## 2022-11-03 DIAGNOSIS — R09.02 HYPOXEMIA: Primary | ICD-10-CM

## 2023-01-24 NOTE — PROGRESS NOTES
Carmela Farfanseble  : 8/10/1955 attended Step 1 Diabetic Education:    Date: 2017   Start time: 9:00 End time: 10:00    Wt 174 lb 6.4 oz   BMI 29.94 kg/m²       Hemoglobin A1c (%)   Date Value   2011 11.4 (H)   ----------  HgbA1C (%)   Date Value sugar (Rule of 15) and actions for lowering high blood glucose levels. States vision has been blurry. Explained how this is due to hyperglycemia. Advised her to wait 4-6 weeks of in-target BG's before getting vision checked for glasses.     Recommendation Paramedian Forehead Flap Division And Inset Text: Division and inset of the paramedian forehead flap was performed to achieve optimal aesthetic result, restore normal anatomic appearance and avoid distortion of normal anatomy, expedite and facilitate wound healing, achieve optimal functional result and because linear closure either not possible or would produce suboptimal result. The patient was prepped and draped in the usual manner. The pedicle was infiltrated with local anesthesia. The pedicle was sectioned with a #15 blade. The pedicle was de-bulked and trimmed to match the shape of the defect. Hemostasis was achieved. The flap donor site and free margin of the flap were secured with deep buried sutures and the wound edges were re-approximated.

## 2023-01-30 ENCOUNTER — TELEPHONE (OUTPATIENT)
Dept: NEPHROLOGY | Facility: CLINIC | Age: 68
End: 2023-01-30

## 2023-01-30 NOTE — TELEPHONE ENCOUNTER
New Pharmacy is calling for refills on Prazosin 5 mg & 2 mg and Isosorbide 60 mg    [facility]

## 2023-02-05 PROCEDURE — 93010 ELECTROCARDIOGRAM REPORT: CPT | Performed by: INTERNAL MEDICINE

## 2023-02-06 ENCOUNTER — EXTERNAL RECORD (OUTPATIENT)
Dept: UROLOGY | Age: 68
End: 2023-02-06

## 2023-02-06 ENCOUNTER — EXTERNAL RECORD (OUTPATIENT)
Dept: HEALTH INFORMATION MANAGEMENT | Facility: OTHER | Age: 68
End: 2023-02-06

## 2023-02-06 ENCOUNTER — EXTERNAL RECORD (OUTPATIENT)
Dept: OTHER | Age: 68
End: 2023-02-06

## 2023-02-06 ENCOUNTER — TELEPHONE (OUTPATIENT)
Dept: UROLOGY | Age: 68
End: 2023-02-06

## 2023-02-06 ENCOUNTER — EXTERNAL RECORD (OUTPATIENT)
Dept: NEPHROLOGY | Age: 68
End: 2023-02-06

## 2023-02-06 DIAGNOSIS — N20.0 NEPHROLITHIASIS: Primary | ICD-10-CM

## 2023-02-06 PROCEDURE — 52330 CYSTOSCOPY AND TREATMENT: CPT | Performed by: UROLOGY

## 2023-02-06 PROCEDURE — 99223 1ST HOSP IP/OBS HIGH 75: CPT | Performed by: HOSPITALIST

## 2023-02-06 PROCEDURE — 99221 1ST HOSP IP/OBS SF/LOW 40: CPT | Performed by: INTERNAL MEDICINE

## 2023-02-06 PROCEDURE — 52332 CYSTOSCOPY AND TREATMENT: CPT | Performed by: UROLOGY

## 2023-02-07 PROCEDURE — 99239 HOSP IP/OBS DSCHRG MGMT >30: CPT | Performed by: HOSPITALIST

## 2023-02-07 PROCEDURE — 99232 SBSQ HOSP IP/OBS MODERATE 35: CPT | Performed by: INTERNAL MEDICINE

## 2023-03-07 ENCOUNTER — APPOINTMENT (OUTPATIENT)
Dept: OTHER | Facility: PHYSICIAN GROUP | Age: 68
End: 2023-03-07
Attending: UROLOGY

## 2023-03-07 LAB
BEDSIDE GLUCOSE: 179 MG/DL (ref 70–110)
BEDSIDE GLUCOSE: 181 MG/DL (ref 70–110)
K (POTASSIUM, SERUM): 4.3 MMOL/L (ref 3.5–5.2)

## 2023-03-13 ENCOUNTER — EXTERNAL RECORD (OUTPATIENT)
Dept: UROLOGY | Age: 68
End: 2023-03-13

## 2023-03-14 ENCOUNTER — TELEPHONE (OUTPATIENT)
Dept: UROLOGY | Age: 68
End: 2023-03-14

## 2023-03-22 ENCOUNTER — APPOINTMENT (OUTPATIENT)
Dept: UROLOGY | Age: 68
End: 2023-03-22

## 2023-03-23 ENCOUNTER — OFFICE VISIT (OUTPATIENT)
Dept: UROLOGY | Age: 68
End: 2023-03-23

## 2023-03-23 DIAGNOSIS — N20.0 NEPHROLITHIASIS: Primary | ICD-10-CM

## 2023-03-23 PROCEDURE — 52310 CYSTOSCOPY AND TREATMENT: CPT | Performed by: UROLOGY

## 2023-03-23 RX ORDER — NITROFURANTOIN 25; 75 MG/1; MG/1
100 CAPSULE ORAL 2 TIMES DAILY
Qty: 6 CAPSULE | Refills: 0 | Status: SHIPPED | OUTPATIENT
Start: 2023-03-23 | End: 2023-03-26

## 2023-03-28 ENCOUNTER — OFFICE VISIT (OUTPATIENT)
Facility: CLINIC | Age: 68
End: 2023-03-28
Payer: MEDICARE

## 2023-03-28 VITALS
BODY MASS INDEX: 34.25 KG/M2 | SYSTOLIC BLOOD PRESSURE: 160 MMHG | OXYGEN SATURATION: 97 % | WEIGHT: 200.63 LBS | RESPIRATION RATE: 16 BRPM | HEIGHT: 64 IN | DIASTOLIC BLOOD PRESSURE: 80 MMHG | HEART RATE: 65 BPM

## 2023-03-28 DIAGNOSIS — G47.33 OSA (OBSTRUCTIVE SLEEP APNEA): Primary | ICD-10-CM

## 2023-03-28 PROCEDURE — 99214 OFFICE O/P EST MOD 30 MIN: CPT | Performed by: INTERNAL MEDICINE

## 2023-03-28 RX ORDER — GLIPIZIDE 2.5 MG/1
2.5 TABLET, EXTENDED RELEASE ORAL
COMMUNITY
Start: 2022-10-25

## 2023-03-28 RX ORDER — CLONIDINE 0.2 MG/24H
1 PATCH, EXTENDED RELEASE TRANSDERMAL WEEKLY
COMMUNITY
Start: 2023-01-27

## 2023-03-28 RX ORDER — METHOCARBAMOL 750 MG/1
50000 TABLET ORAL WEEKLY
COMMUNITY
Start: 2022-12-29

## 2023-03-28 RX ORDER — CALCIUM CARBONATE 750 MG/1
1 TABLET, CHEWABLE ORAL DAILY PRN
COMMUNITY
Start: 2022-11-07

## 2023-03-28 NOTE — PATIENT INSTRUCTIONS
Plan:  -continue good work with machine   - to get overnight oximetry - on the cpap machine - call if no word in 2 weeks again   - call with any issues   - see me in 6 months     Maynor Black MD  Pulmonary Medicine  03/28/23

## 2023-04-06 ENCOUNTER — APPOINTMENT (OUTPATIENT)
Dept: PODIATRY | Age: 68
End: 2023-04-06

## 2023-04-13 ENCOUNTER — OFFICE VISIT (OUTPATIENT)
Dept: PODIATRY | Age: 68
End: 2023-04-13

## 2023-04-13 DIAGNOSIS — E11.22 TYPE 2 DIABETES MELLITUS WITH CHRONIC KIDNEY DISEASE ON CHRONIC DIALYSIS, WITHOUT LONG-TERM CURRENT USE OF INSULIN (CMD): ICD-10-CM

## 2023-04-13 DIAGNOSIS — N18.6 TYPE 2 DIABETES MELLITUS WITH CHRONIC KIDNEY DISEASE ON CHRONIC DIALYSIS, WITHOUT LONG-TERM CURRENT USE OF INSULIN (CMD): ICD-10-CM

## 2023-04-13 DIAGNOSIS — M19.072 OSTEOARTHRITIS OF LEFT ANKLE AND FOOT: ICD-10-CM

## 2023-04-13 DIAGNOSIS — Z99.2 TYPE 2 DIABETES MELLITUS WITH CHRONIC KIDNEY DISEASE ON CHRONIC DIALYSIS, WITHOUT LONG-TERM CURRENT USE OF INSULIN (CMD): ICD-10-CM

## 2023-04-13 DIAGNOSIS — M19.071 OSTEOARTHRITIS OF RIGHT ANKLE AND FOOT: ICD-10-CM

## 2023-04-13 DIAGNOSIS — E11.42 TYPE 2 DIABETES MELLITUS WITH DIABETIC POLYNEUROPATHY, WITHOUT LONG-TERM CURRENT USE OF INSULIN (CMD): Primary | ICD-10-CM

## 2023-04-13 PROCEDURE — 99203 OFFICE O/P NEW LOW 30 MIN: CPT | Performed by: PODIATRIST

## 2023-04-16 PROBLEM — E78.2 MIXED HYPERLIPIDEMIA DUE TO TYPE 2 DIABETES MELLITUS (CMD): Status: ACTIVE | Noted: 2020-01-17

## 2023-04-16 PROBLEM — E11.69 MIXED HYPERLIPIDEMIA DUE TO TYPE 2 DIABETES MELLITUS (CMD): Status: ACTIVE | Noted: 2020-01-17

## 2023-04-16 PROBLEM — D47.2 MGUS (MONOCLONAL GAMMOPATHY OF UNKNOWN SIGNIFICANCE): Status: ACTIVE | Noted: 2023-04-16

## 2023-04-16 PROBLEM — N18.6 END STAGE RENAL FAILURE ON DIALYSIS (CMD): Status: ACTIVE | Noted: 2023-04-16

## 2023-04-16 PROBLEM — I10 ESSENTIAL HYPERTENSION: Status: ACTIVE | Noted: 2023-04-16

## 2023-04-16 PROBLEM — Z99.2 END STAGE RENAL FAILURE ON DIALYSIS  (CMD): Status: ACTIVE | Noted: 2023-04-16

## 2023-04-16 PROBLEM — E11.42 TYPE 2 DIABETES MELLITUS WITH DIABETIC POLYNEUROPATHY, WITHOUT LONG-TERM CURRENT USE OF INSULIN (CMD): Status: ACTIVE | Noted: 2023-04-16

## 2023-07-20 ENCOUNTER — OFFICE VISIT (OUTPATIENT)
Dept: PODIATRY | Age: 68
End: 2023-07-20

## 2023-07-20 DIAGNOSIS — N18.6 TYPE 2 DIABETES MELLITUS WITH CHRONIC KIDNEY DISEASE ON CHRONIC DIALYSIS, WITHOUT LONG-TERM CURRENT USE OF INSULIN (CMD): ICD-10-CM

## 2023-07-20 DIAGNOSIS — S90.222A SUBUNGUAL HEMATOMA OF LEFT FOOT, INITIAL ENCOUNTER: ICD-10-CM

## 2023-07-20 DIAGNOSIS — Z99.2 TYPE 2 DIABETES MELLITUS WITH CHRONIC KIDNEY DISEASE ON CHRONIC DIALYSIS, WITHOUT LONG-TERM CURRENT USE OF INSULIN (CMD): ICD-10-CM

## 2023-07-20 DIAGNOSIS — E11.22 TYPE 2 DIABETES MELLITUS WITH CHRONIC KIDNEY DISEASE ON CHRONIC DIALYSIS, WITHOUT LONG-TERM CURRENT USE OF INSULIN (CMD): ICD-10-CM

## 2023-07-20 DIAGNOSIS — E11.42 TYPE 2 DIABETES MELLITUS WITH DIABETIC POLYNEUROPATHY, WITHOUT LONG-TERM CURRENT USE OF INSULIN (CMD): Primary | ICD-10-CM

## 2023-07-20 PROCEDURE — 99213 OFFICE O/P EST LOW 20 MIN: CPT | Performed by: PODIATRIST

## 2023-08-10 ENCOUNTER — TELEPHONE (OUTPATIENT)
Facility: CLINIC | Age: 68
End: 2023-08-10

## 2023-08-10 NOTE — TELEPHONE ENCOUNTER
Received fax from DME requesting physician signature for CPAP supplies. Placed on dr [employer] for signing. Will then fax completed form back to DME.

## 2023-08-11 ENCOUNTER — MED REC SCAN ONLY (OUTPATIENT)
Facility: CLINIC | Age: 68
End: 2023-08-11

## 2023-09-26 ENCOUNTER — IMAGING SERVICES (OUTPATIENT)
Dept: GENERAL RADIOLOGY | Age: 68
End: 2023-09-26
Attending: UROLOGY

## 2023-09-26 ENCOUNTER — OFFICE VISIT (OUTPATIENT)
Dept: UROLOGY | Age: 68
End: 2023-09-26

## 2023-09-26 VITALS — WEIGHT: 196 LBS | HEIGHT: 65 IN | TEMPERATURE: 97.1 F | BODY MASS INDEX: 32.65 KG/M2

## 2023-09-26 DIAGNOSIS — N20.0 NEPHROLITHIASIS: ICD-10-CM

## 2023-09-26 DIAGNOSIS — N20.0 NEPHROLITHIASIS: Primary | ICD-10-CM

## 2023-09-26 PROCEDURE — 74018 RADEX ABDOMEN 1 VIEW: CPT | Performed by: RADIOLOGY

## 2023-09-26 PROCEDURE — 99213 OFFICE O/P EST LOW 20 MIN: CPT | Performed by: UROLOGY

## 2023-09-26 RX ORDER — CLONIDINE HYDROCHLORIDE 0.1 MG/1
TABLET, EXTENDED RELEASE ORAL
COMMUNITY
Start: 2020-10-19

## 2023-09-26 RX ORDER — ATORVASTATIN CALCIUM 10 MG/1
1 TABLET, FILM COATED ORAL NIGHTLY
COMMUNITY
Start: 2023-06-26

## 2023-09-26 RX ORDER — PRAZOSIN HYDROCHLORIDE 5 MG/1
CAPSULE ORAL
COMMUNITY
Start: 2023-09-25

## 2023-09-26 RX ORDER — PRAZOSIN HYDROCHLORIDE 1 MG/1
2 CAPSULE ORAL
COMMUNITY

## 2023-09-26 RX ORDER — HYDRALAZINE HYDROCHLORIDE 50 MG/1
100 TABLET, FILM COATED ORAL 3 TIMES DAILY
COMMUNITY
Start: 2023-07-31

## 2023-09-26 RX ORDER — CHOLECALCIFEROL (VITAMIN D3) 1250 MCG
1.25 CAPSULE ORAL
COMMUNITY
Start: 2023-06-23

## 2023-09-26 RX ORDER — LOSARTAN POTASSIUM 100 MG/1
TABLET ORAL
COMMUNITY
Start: 2021-07-23

## 2023-09-28 ENCOUNTER — OFFICE VISIT (OUTPATIENT)
Facility: CLINIC | Age: 68
End: 2023-09-28
Payer: MEDICARE

## 2023-09-28 VITALS
HEART RATE: 51 BPM | DIASTOLIC BLOOD PRESSURE: 68 MMHG | SYSTOLIC BLOOD PRESSURE: 132 MMHG | OXYGEN SATURATION: 96 % | RESPIRATION RATE: 16 BRPM | WEIGHT: 203 LBS | HEIGHT: 64 IN | BODY MASS INDEX: 34.66 KG/M2

## 2023-09-28 DIAGNOSIS — G47.33 OSA (OBSTRUCTIVE SLEEP APNEA): Primary | ICD-10-CM

## 2023-09-28 PROCEDURE — 99214 OFFICE O/P EST MOD 30 MIN: CPT | Performed by: INTERNAL MEDICINE

## 2023-09-28 NOTE — PATIENT INSTRUCTIONS
-Plan:  -continue good work with machine   - to get overnight oximetry - on the cpap machine - call if no word in 2 weeks again   - consider checking oxygen sats with walking at times   - ill get echo from [facility]   -consider RSV vaccine -   - see me in 6 months     Lenora Major MD  Pulmonary Medicine  [phone]

## 2023-09-29 ENCOUNTER — TELEPHONE (OUTPATIENT)
Facility: CLINIC | Age: 68
End: 2023-09-29

## 2023-09-29 DIAGNOSIS — R09.02 HYPOXEMIA: Primary | ICD-10-CM

## 2023-09-29 NOTE — TELEPHONE ENCOUNTER
Per Dr. Kumar Joyce:  Can you please order overnight oximetry on her CPAP machine -- [facility]   Pt notified to call Fabiana Kinney if she does not hear from them by the end of next week.

## 2023-10-12 ENCOUNTER — TELEPHONE (OUTPATIENT)
Facility: CLINIC | Age: 68
End: 2023-10-12

## 2023-10-13 NOTE — TELEPHONE ENCOUNTER
Per Dr. Maritza Stratton, ovox on cpap without oxygen showed o2 sats were good. Pt informed and has no additional questions.

## 2023-10-16 ENCOUNTER — MED REC SCAN ONLY (OUTPATIENT)
Facility: CLINIC | Age: 68
End: 2023-10-16

## 2023-11-07 ENCOUNTER — OFFICE VISIT (OUTPATIENT)
Dept: PODIATRY | Age: 68
End: 2023-11-07

## 2023-11-07 DIAGNOSIS — E11.42 TYPE 2 DIABETES MELLITUS WITH DIABETIC POLYNEUROPATHY, WITHOUT LONG-TERM CURRENT USE OF INSULIN (CMD): Primary | ICD-10-CM

## 2023-11-07 DIAGNOSIS — N18.6 TYPE 2 DIABETES MELLITUS WITH CHRONIC KIDNEY DISEASE ON CHRONIC DIALYSIS, WITHOUT LONG-TERM CURRENT USE OF INSULIN (CMD): ICD-10-CM

## 2023-11-07 DIAGNOSIS — L60.3 NAIL DYSTROPHY: ICD-10-CM

## 2023-11-07 DIAGNOSIS — E11.22 TYPE 2 DIABETES MELLITUS WITH CHRONIC KIDNEY DISEASE ON CHRONIC DIALYSIS, WITHOUT LONG-TERM CURRENT USE OF INSULIN (CMD): ICD-10-CM

## 2023-11-07 DIAGNOSIS — Z99.2 TYPE 2 DIABETES MELLITUS WITH CHRONIC KIDNEY DISEASE ON CHRONIC DIALYSIS, WITHOUT LONG-TERM CURRENT USE OF INSULIN (CMD): ICD-10-CM

## 2023-11-07 PROCEDURE — 99213 OFFICE O/P EST LOW 20 MIN: CPT | Performed by: PODIATRIST

## 2023-12-07 ENCOUNTER — MED REC SCAN ONLY (OUTPATIENT)
Facility: CLINIC | Age: 68
End: 2023-12-07

## 2024-02-13 ENCOUNTER — APPOINTMENT (OUTPATIENT)
Dept: PODIATRY | Age: 69
End: 2024-02-13

## 2024-02-13 DIAGNOSIS — L60.3 NAIL DYSTROPHY: ICD-10-CM

## 2024-02-13 DIAGNOSIS — E11.22 TYPE 2 DIABETES MELLITUS WITH CHRONIC KIDNEY DISEASE ON CHRONIC DIALYSIS, WITHOUT LONG-TERM CURRENT USE OF INSULIN (CMD): ICD-10-CM

## 2024-02-13 DIAGNOSIS — Z99.2 TYPE 2 DIABETES MELLITUS WITH CHRONIC KIDNEY DISEASE ON CHRONIC DIALYSIS, WITHOUT LONG-TERM CURRENT USE OF INSULIN (CMD): ICD-10-CM

## 2024-02-13 DIAGNOSIS — N18.6 TYPE 2 DIABETES MELLITUS WITH CHRONIC KIDNEY DISEASE ON CHRONIC DIALYSIS, WITHOUT LONG-TERM CURRENT USE OF INSULIN (CMD): ICD-10-CM

## 2024-02-13 DIAGNOSIS — E11.42 TYPE 2 DIABETES MELLITUS WITH DIABETIC POLYNEUROPATHY, WITHOUT LONG-TERM CURRENT USE OF INSULIN (CMD): Primary | ICD-10-CM

## 2024-02-13 PROCEDURE — 99213 OFFICE O/P EST LOW 20 MIN: CPT | Performed by: PODIATRIST

## 2024-03-28 ENCOUNTER — OFFICE VISIT (OUTPATIENT)
Facility: CLINIC | Age: 69
End: 2024-03-28
Payer: MEDICARE

## 2024-03-28 VITALS
BODY MASS INDEX: 35.34 KG/M2 | OXYGEN SATURATION: 96 % | HEART RATE: 56 BPM | RESPIRATION RATE: 16 BRPM | SYSTOLIC BLOOD PRESSURE: 144 MMHG | HEIGHT: 64 IN | WEIGHT: 207 LBS | DIASTOLIC BLOOD PRESSURE: 76 MMHG

## 2024-03-28 DIAGNOSIS — G47.33 OSA (OBSTRUCTIVE SLEEP APNEA): Primary | ICD-10-CM

## 2024-03-28 PROCEDURE — 99214 OFFICE O/P EST MOD 30 MIN: CPT | Performed by: INTERNAL MEDICINE

## 2024-03-28 RX ORDER — PRAZOSIN HYDROCHLORIDE 2 MG/1
2 CAPSULE ORAL NIGHTLY
COMMUNITY

## 2024-03-28 NOTE — PROGRESS NOTES
Pulmonary Progress Note    History of Present Illness:  Mian Estrada is a 68 year old female presenting to pulmonary clinic   thiks doing well   Ongoing eval at [facility]   Listed at [facility]- - now  Repeat ECHO with decreased PAS-- to 50   Had RHC- in [facility] - in nov -   Feeling much better- - sl more exercise -   GI function has gotten better- - diarrhea - metamucil daily - not accidents as prior - - waves -- dr roberto - thinks meds related -   Much better -- 2017 - worse - now so much better- -   Kidney stone in feb-   Pain- with n/v- with elevated BP-   Gets tired- - walking up incline ramp- - can note dyspnea -   Sleeping well- with machine-- occasionally  Awakenings when turing over-- to sleep fast --     9.23- asked for kidney -   Had new echo - in feb - RVSP- from 70mmHg to 50mmHg -   Still can't be at [facility] -   One false alarm - as well  At dry weight -   More good days- - to gym- walks 20min without stopping 1.5 miles per hour - makes BP go up   Never got overnight- - needs to call   -- rested in AMs - on most days-- some days not -   Rare awakening -     3.24- BP is better - at HD-- bp goes down-- too down - -   HD stable at dry weight-   GI wise is no stable   For nasal surgery- polyp with chornic sinusitus- [facility] at [facility]-   Had allergy testing--- all else is neg-- cats remain -- then told need surgery   Sx- thinks for years- nightly congestion and taking cpap off- sinus pressure and throat irritation and PND- ongoing   Had 2 weeks of doxycycline then felt much better-   And then again with doxycycline for one week -   Sleeping well with machine -- sleeps well- 7 hours -                 Social History:   Social History     Socioeconomic History    Marital status:    Tobacco Use    Smoking status: Never     Passive exposure: Never    Smokeless tobacco: Never   Vaping Use    Vaping Use: Never used   Substance and Sexual Activity    Alcohol use: Yes     Comment: rare    Drug  use: No   Other Topics Concern    Seat Belt Yes        Medications:   Current Outpatient Medications   Medication Sig Dispense Refill    prazosin 2 MG Oral Cap Take 1 capsule (2 mg total) by mouth nightly.      Psyllium (METAMUCIL OR) 1 Lancet by Finger stick route 2 (two) times daily.      Calcium Carbonate Antacid (TUMS SMOOTHIES) 750 MG Oral Chew Tab Chew 1 tablet (750 mg total) by mouth daily as needed. As directed      D3-50 1.25 MG (43838 UT) Oral Cap Take 1 capsule (50,000 Units total) by mouth once a week.      cloNIDine 0.2 MG/24HR Transdermal Patch Weekly Place 1 patch onto the skin once a week.      glipiZIDE ER 2.5 MG Oral Tablet 24 Hr Take 1 tablet (2.5 mg total) by mouth in the morning and 1 tablet (2.5 mg total) before bedtime.      ergocalciferol 1.25 MG (72905 UT) Oral Cap Take by mouth.      hydrALAZINE 50 MG Oral Tab Take 2 tablets (100 mg total) by mouth 3 (three) times daily. As needed for morning dose      losartan 100 MG Oral Tab Take 1 tablet (100 mg total) by mouth daily.      prazosin 5 MG Oral Cap Take 1 capsule (5 mg total) by mouth nightly.      prazosin 1 MG Oral Cap Take 2 capsules (2 mg total) by mouth nightly.      atorvastatin 10 MG Oral Tab Take 1 tablet (10 mg total) by mouth nightly. 90 tablet 5    carvedilol 25 MG Oral Tab Take 2 tablets (50 mg total) by mouth 2 (two) times daily with meals. 360 tablet 3    torsemide 100 MG Oral Tab Take 1 tablet (100 mg total) by mouth daily. Only on non-dialysis days      Isosorbide Mononitrate ER 60 MG Oral Tablet 24 Hr Take 1 tablet (60 mg total) by mouth daily.         Review of Systems:    Weight is stable - remains on HD- dry weight   GI - no gerd noted - remains with diarrhea explosve as above - wears diaper -episodes have improved though remain and limited her ability to go out and exercise  Overall much improved--remains on Metamucil daily    ENT - no issues     SKIN no issues     LE some right leg pre- HD and some in belly when fluid  overloaded     Takes some torsemeide 100mg as needed - on non-HD days - takes once a day not twice a day     Sleep doing well with machine and notes benefit - once awakening to turn over       Physical Exam:  /76 (BP Location: Right arm, Patient Position: Sitting, Cuff Size: large)   Pulse 56   Resp 16   Ht 5' 4\" (1.626 m)   Wt 207 lb (93.9 kg)   LMP 07/04/2010   SpO2 96%   BMI 35.53 kg/m²    Constitutional: comfortable . No acute distress.  Comfortable  HEENT: Head NC/AT. PEERL. Throat is clear no evidence of thrush  Cardio: .  Regular rate and rhythm with distant murmur  Respiratory: Minimal crackles at both bases that clear no auscultated wheeze better air entry  GI:  Abd soft, non-tender.  Extremities: No clubbing .  No edema nontender  Neurologic: A&Ox3. No gross motor deficits.  Skin: Warm, dry.no rashes or hives noted   Lymphatic: No cervical or supraclavicular lymphadenopathy.  No JVD  Psych: Calm, cooperative. Pleasant affect.       Results: reviewed     Assessment/Plan:    #Pulmonary hypertension  In hospital 7/21 with volume overload grade 2 on echo with pulmonary hypertension PASP 51  6/22 PAP 70 on echo with Dr. Cox in 3/22 plan for repeat echo May need right heart cath  9/22-remains clinically stable with stable fluid status and stable dry weight on hemodialysis  Repeat echo with PAS 77 RV function remains preserved diastolic dysfunction without change--patient now seeing cardiology at [facility] pretransplant with their echo pending May need right heart cath pretransplant  3/23 underwent right heart cath and told all was okay results unknown to me--repeat echo now with PAS down to 50 significantly improved February 2023-to get results of echo August 2023 at [facility]  3.24- dr justino sloan at [facility] to see -- last echo decreased to 50mmHg   Fluid status controlled by dialysis    #End-stage renal disease  Long history of chronic kidney disease hemodialysis started 7/21 6/22 BP was high during  end-stage renal disease 3 weeks ago BP now better thought not related to fluid  9/22 stable fluid status ongoing evaluation for transplant  3/23 now listed  9.23- continues to be listed- - at dry weight - 3 days a week   3.24- to list at [place]     #Obstructive sleep apnea  Negative study 2018 at [facility]   Suspected with severe desaturation in hospital at night ongoing fatigue and awakenings  1/22 study with AHI of 16 REM 46 supine 120 with lateral AHI of 16 ekaterina of 82% for titration  6/22 started CPAP 3/22 doing very well great usage AHI 0.1 noticeably benefiting  9/22 download with AHI 0.1 though continues to struggle with time-some improvement ongoing  Plan to check overnight to assure adequate oxygenation.  3.28.23 ahi 0.0!- doing well with machine-   Seeing counselor now for covid /family unwilling to get vaccinated  To recheck overnight-- in NOv with 70min below 88%   9.23- doing great - dubeck-- to get overnight to assure adequate oxygenation given the pulmonary hypertension- feels rested on some days   3.24- cpap doing very well- ahi 0.2 with  great usage     #Hypertension with diastolic dysfunction  Pulmonary hypertension as above fluid status controlled and states blood pressure has been improved  Remains improved   9.23- remains stable - up and down     #Diabetes  Following    # Chronic sinusitis-- for polyp ectomy and told CT with very chronic sinusitis thought for the past 20 years-interested [facility] - and for sinus surgery -      -Plan:  -continue good work with machine   - consider checking oxygen sats with walking at times   - see me in 6 months     Brionna Harris MD  Pulmonary Medicine  03.28.24

## 2024-03-28 NOTE — PATIENT INSTRUCTIONS
Plan:  -continue good work with machine   - consider checking oxygen sats with walking at times   - see me in 6 months     Brionna Harris MD  Pulmonary Medicine  03.28.24

## 2024-05-14 ENCOUNTER — APPOINTMENT (OUTPATIENT)
Dept: PODIATRY | Age: 69
End: 2024-05-14

## 2024-05-14 DIAGNOSIS — E11.22 TYPE 2 DIABETES MELLITUS WITH CHRONIC KIDNEY DISEASE ON CHRONIC DIALYSIS, WITHOUT LONG-TERM CURRENT USE OF INSULIN  (CMD): ICD-10-CM

## 2024-05-14 DIAGNOSIS — Z99.2 TYPE 2 DIABETES MELLITUS WITH CHRONIC KIDNEY DISEASE ON CHRONIC DIALYSIS, WITHOUT LONG-TERM CURRENT USE OF INSULIN  (CMD): ICD-10-CM

## 2024-05-14 DIAGNOSIS — N18.6 TYPE 2 DIABETES MELLITUS WITH CHRONIC KIDNEY DISEASE ON CHRONIC DIALYSIS, WITHOUT LONG-TERM CURRENT USE OF INSULIN  (CMD): ICD-10-CM

## 2024-05-14 DIAGNOSIS — L60.3 NAIL DYSTROPHY: ICD-10-CM

## 2024-05-14 DIAGNOSIS — E11.42 TYPE 2 DIABETES MELLITUS WITH DIABETIC POLYNEUROPATHY, WITHOUT LONG-TERM CURRENT USE OF INSULIN  (CMD): Primary | ICD-10-CM

## 2024-05-14 PROCEDURE — 99213 OFFICE O/P EST LOW 20 MIN: CPT | Performed by: PODIATRIST

## 2024-06-03 RX ORDER — LOSARTAN POTASSIUM 100 MG/1
100 TABLET ORAL DAILY
Qty: 90 TABLET | Refills: 3 | OUTPATIENT
Start: 2024-06-03

## 2024-07-22 ENCOUNTER — MED REC SCAN ONLY (OUTPATIENT)
Facility: CLINIC | Age: 69
End: 2024-07-22

## 2024-08-13 ENCOUNTER — APPOINTMENT (OUTPATIENT)
Dept: PODIATRY | Age: 69
End: 2024-08-13

## 2024-08-13 DIAGNOSIS — E11.22 TYPE 2 DIABETES MELLITUS WITH CHRONIC KIDNEY DISEASE ON CHRONIC DIALYSIS, WITHOUT LONG-TERM CURRENT USE OF INSULIN  (CMD): ICD-10-CM

## 2024-08-13 DIAGNOSIS — N18.6 TYPE 2 DIABETES MELLITUS WITH CHRONIC KIDNEY DISEASE ON CHRONIC DIALYSIS, WITHOUT LONG-TERM CURRENT USE OF INSULIN  (CMD): ICD-10-CM

## 2024-08-13 DIAGNOSIS — E11.42 TYPE 2 DIABETES MELLITUS WITH DIABETIC POLYNEUROPATHY, WITHOUT LONG-TERM CURRENT USE OF INSULIN  (CMD): Primary | ICD-10-CM

## 2024-08-13 DIAGNOSIS — Z99.2 TYPE 2 DIABETES MELLITUS WITH CHRONIC KIDNEY DISEASE ON CHRONIC DIALYSIS, WITHOUT LONG-TERM CURRENT USE OF INSULIN  (CMD): ICD-10-CM

## 2024-08-13 DIAGNOSIS — L60.3 NAIL DYSTROPHY: ICD-10-CM

## 2024-08-13 PROCEDURE — 99213 OFFICE O/P EST LOW 20 MIN: CPT | Performed by: PODIATRIST

## 2024-08-13 RX ORDER — CYCLOBENZAPRINE HCL 5 MG
5 TABLET ORAL
COMMUNITY
Start: 2024-07-16

## 2024-08-13 RX ORDER — ATORVASTATIN CALCIUM 10 MG/1
10 TABLET, FILM COATED ORAL
COMMUNITY

## 2024-08-13 RX ORDER — FLUTICASONE PROPIONATE 50 MCG
1 SPRAY, SUSPENSION (ML) NASAL
COMMUNITY

## 2024-08-13 RX ORDER — METHYLPREDNISOLONE 4 MG
TABLET, DOSE PACK ORAL
COMMUNITY
Start: 2024-08-05

## 2024-08-13 RX ORDER — SOD CHLOR,BICARB/SQUEEZ BOTTLE
1 PACKET, WITH RINSE DEVICE NASAL
COMMUNITY
Start: 2024-05-28

## 2024-08-13 RX ORDER — AZELASTINE 1 MG/ML
SPRAY, METERED NASAL
COMMUNITY

## 2024-08-13 RX ORDER — DOXYCYCLINE 100 MG/1
CAPSULE ORAL
COMMUNITY
Start: 2024-05-23

## 2024-08-13 RX ORDER — CLONIDINE 0.2 MG/24H
PATCH, EXTENDED RELEASE TRANSDERMAL
COMMUNITY

## 2024-08-13 RX ORDER — GLIPIZIDE 5 MG/1
5 TABLET, FILM COATED, EXTENDED RELEASE ORAL 2 TIMES DAILY
COMMUNITY

## 2024-08-19 ENCOUNTER — MED REC SCAN ONLY (OUTPATIENT)
Dept: NEPHROLOGY | Facility: CLINIC | Age: 69
End: 2024-08-19

## 2024-08-27 RX ORDER — LOSARTAN POTASSIUM 100 MG/1
100 TABLET ORAL DAILY
Qty: 90 TABLET | Refills: 3 | OUTPATIENT
Start: 2024-08-27

## 2024-09-03 RX ORDER — CLONIDINE 0.2 MG/24H
PATCH, EXTENDED RELEASE TRANSDERMAL
Qty: 13 PATCH | Refills: 4 | OUTPATIENT
Start: 2024-09-03

## 2024-09-09 RX ORDER — PRAZOSIN HYDROCHLORIDE 1 MG/1
CAPSULE ORAL
Qty: 180 CAPSULE | Refills: 4 | OUTPATIENT
Start: 2024-09-09

## 2024-09-26 ENCOUNTER — IMAGING SERVICES (OUTPATIENT)
Dept: GENERAL RADIOLOGY | Age: 69
End: 2024-09-26
Attending: UROLOGY

## 2024-09-26 ENCOUNTER — APPOINTMENT (OUTPATIENT)
Dept: UROLOGY | Age: 69
End: 2024-09-26

## 2024-09-26 VITALS — TEMPERATURE: 96.9 F | HEIGHT: 65 IN | WEIGHT: 198 LBS | BODY MASS INDEX: 32.99 KG/M2

## 2024-09-26 DIAGNOSIS — N20.0 NEPHROLITHIASIS: ICD-10-CM

## 2024-09-26 DIAGNOSIS — N20.0 NEPHROLITHIASIS: Primary | ICD-10-CM

## 2024-09-26 PROCEDURE — 74018 RADEX ABDOMEN 1 VIEW: CPT | Performed by: RADIOLOGY

## 2024-10-03 ENCOUNTER — OFFICE VISIT (OUTPATIENT)
Facility: CLINIC | Age: 69
End: 2024-10-03
Payer: MEDICARE

## 2024-10-03 VITALS
BODY MASS INDEX: 33.97 KG/M2 | WEIGHT: 199 LBS | DIASTOLIC BLOOD PRESSURE: 82 MMHG | OXYGEN SATURATION: 98 % | SYSTOLIC BLOOD PRESSURE: 140 MMHG | HEART RATE: 62 BPM | HEIGHT: 64 IN | RESPIRATION RATE: 16 BRPM

## 2024-10-03 DIAGNOSIS — G47.33 OSA (OBSTRUCTIVE SLEEP APNEA): Primary | ICD-10-CM

## 2024-10-03 DIAGNOSIS — I27.20 PULMONARY HYPERTENSION (HCC): ICD-10-CM

## 2024-10-03 DIAGNOSIS — R09.02 HYPOXEMIA: ICD-10-CM

## 2024-10-03 PROCEDURE — 99214 OFFICE O/P EST MOD 30 MIN: CPT | Performed by: INTERNAL MEDICINE

## 2024-10-03 RX ORDER — SOD CHLOR,BICARB/SQUEEZ BOTTLE
1 PACKET, WITH RINSE DEVICE NASAL
COMMUNITY
Start: 2024-05-28

## 2024-10-03 RX ORDER — SOD CHLOR,BICARB/SQUEEZ BOTTLE
1 PACKET, WITH RINSE DEVICE NASAL 2 TIMES DAILY
COMMUNITY
Start: 2024-05-28

## 2024-10-03 NOTE — PATIENT INSTRUCTIONS
-Plan:  -continue good work with machine   - continue  checking oxygen sats with walking at times -   - see me in 9 months     Brionna Harris MD  Pulmonary Medicine  10.3.24

## 2024-10-03 NOTE — PROGRESS NOTES
Pulmonary Progress Note    History of Present Illness:  Mian Estrada is a 69 year old female presenting to pulmonary clinic   thiks doing well   Ongoing eval at [facility]   Listed at [facility]- - now  Repeat ECHO with decreased PAS-- to 50   Had RHC- in [facility] - in nov -   Feeling much better- - sl more exercise -   GI function has gotten better- - diarrhea - metamucil daily - not accidents as prior - - waves -- dr roberto - thinks meds related -   Much better -- 2017 - worse - now so much better- -   Kidney stone in feb-   Pain- with n/v- with elevated BP-   Gets tired- - walking up incline ramp- - can note dyspnea -   Sleeping well- with machine-- occasionally  Awakenings when turing over-- to sleep fast --     9.23- asked for kidney -   Had new echo - in feb - RVSP- from 70mmHg to 50mmHg -   Still can't be at [facility] -   One false alarm - as well  At dry weight -   More good days- - to gym- walks 20min without stopping 1.5 miles per hour - makes BP go up   Never got overnight- - needs to call   -- rested in AMs - on most days-- some days not -   Rare awakening -     3.24- BP is better - at HD-- bp goes down-- too down - -   HD stable at dry weight-   GI wise is no stable   For nasal surgery- polyp with chornic sinusitus- [facility] at [facility]-   Had allergy testing--- all else is neg-- cats remain -- then told need surgery   Sx- thinks for years- nightly congestion and taking cpap off- sinus pressure and throat irritation and PND- ongoing   Had 2 weeks of doxycycline then felt much better-   And then again with doxycycline for one week -   Sleeping well with machine -- sleeps well- 7 hours -     10/24Had sinus surgery - left side and now so much better remains on sinus rinse - with steroids - now on cpap - easier now much better - breathing is much better-   Sats with exertion lowest 88% and recoveries very fast- - while walking - 6min and recovery - lowest 88% - 91-94% mostly   Able to progress- some  - afraid to move with BP issues   7 yrs since onset heart failure -   No coughing   Clear cxr in 6/24 - wants to apply at [facility] -   And in [place]- not enough social support - and some support from reunion recent - needs new team -   Switching to [place] - and new renal MD_   Using cane more -- more back issues and ankles and endurance -                     Social History:   Social History     Socioeconomic History    Marital status:    Tobacco Use    Smoking status: Never     Passive exposure: Never    Smokeless tobacco: Never   Vaping Use    Vaping status: Never Used   Substance and Sexual Activity    Alcohol use: Yes     Comment: rare    Drug use: No   Other Topics Concern    Seat Belt Yes        Medications:   Current Outpatient Medications   Medication Sig Dispense Refill    Hypertonic Nasal Wash (SINUS RINSE KIT) Nasal Powd Pack Irrigate with 1 packet as directed.      Hypertonic Nasal Wash (SINUS RINSE KIT) Nasal Powd Pack Irrigate with 1 packet as directed.      Hypertonic Nasal Wash (SINUS RINSE KIT) Nasal Powd Pack Irrigate with 1 packet as directed 2 (two) times daily.      prazosin 2 MG Oral Cap Take 1 capsule (2 mg total) by mouth nightly.      Psyllium (METAMUCIL OR) 1 Lancet by Finger stick route 2 (two) times daily.      Calcium Carbonate Antacid (TUMS SMOOTHIES) 750 MG Oral Chew Tab Chew 1 tablet (750 mg total) by mouth daily as needed. As directed      D3-50 1.25 MG (85768 UT) Oral Cap Take 1 capsule (50,000 Units total) by mouth once a week.      cloNIDine 0.2 MG/24HR Transdermal Patch Weekly Place 1 patch onto the skin once a week.      glipiZIDE ER 2.5 MG Oral Tablet 24 Hr Take 1 tablet (2.5 mg total) by mouth in the morning and 1 tablet (2.5 mg total) before bedtime.      ergocalciferol 1.25 MG (62283 UT) Oral Cap Take by mouth.      hydrALAZINE 50 MG Oral Tab Take 2 tablets (100 mg total) by mouth 3 (three) times daily. As needed for morning dose      losartan 100 MG Oral Tab Take 1  tablet (100 mg total) by mouth daily.      prazosin 5 MG Oral Cap Take 1 capsule (5 mg total) by mouth nightly.      prazosin 1 MG Oral Cap Take 2 capsules (2 mg total) by mouth nightly.      atorvastatin 10 MG Oral Tab Take 1 tablet (10 mg total) by mouth nightly. 90 tablet 5    carvedilol 25 MG Oral Tab Take 2 tablets (50 mg total) by mouth 2 (two) times daily with meals. 360 tablet 3    torsemide 100 MG Oral Tab Take 1 tablet (100 mg total) by mouth daily. Only on non-dialysis days      Isosorbide Mononitrate ER 60 MG Oral Tablet 24 Hr Take 1 tablet (60 mg total) by mouth daily.         Review of Systems:    Weight is stable - remains on HD- dry weight   GI - no gerd noted - remains with diarrhea explosve as above - wears diaper -episodes have improved though remain and limited her ability to go out and exercise  Overall much improved--remains on Metamucil daily    ENT -doing well postop ongoing rinses with steroid    SKIN no issues     LE some right leg pre- HD and some in belly when fluid overloaded     Takes some torsemeide 100mg as needed - on non-HD days - takes once a day not twice a day     Sleep doing well with machine and notes benefit - once awakening to turn over       Physical Exam:  /82   Pulse 62   Resp 16   Ht 5' 4\" (1.626 m)   Wt 199 lb (90.3 kg)   LMP 07/04/2010   SpO2 98%   BMI 34.16 kg/m²    Constitutional: comfortable . No acute distress.  Comfortable  HEENT: Head NC/AT. PEERL. Throat is clear no evidence of thrush  Cardio: .  Regular rate and rhythm with distant murmur  Respiratory: Minimal crackles at both bases that clear no auscultated wheeze better air entry  GI:  Abd soft, non-tender.  Extremities: No clubbing .  No edema nontender  Neurologic: A&Ox3. No gross motor deficits.  Skin: Warm, dry.no rashes or hives noted   Lymphatic: No cervical or supraclavicular lymphadenopathy.  No JVD  Psych: Calm, cooperative. Pleasant affect.       Results: reviewed      Assessment/Plan:    #Pulmonary hypertension  In hospital 7/21 with volume overload grade 2 on echo with pulmonary hypertension PASP 51  6/22 PAP 70 on echo with Dr. Cox in 3/22 plan for repeat echo May need right heart cath  9/22-remains clinically stable with stable fluid status and stable dry weight on hemodialysis  Repeat echo with PAS 77 RV function remains preserved diastolic dysfunction without change--patient now seeing cardiology at [facility] pretransplant with their echo pending May need right heart cath pretransplant  3/23 underwent right heart cath and told all was okay results unknown to me--repeat echo now with PAS down to 50 significantly improved February 2023-to get results of echo August 2023 at [facility]  3.24- dr justino sloan at [facility] to see -- last echo decreased to 50mmHg   Fluid status controlled by dialysis  10.24 - dr sloan mostly - low BP - titrates hydralazine - gets some dizzyness with low BP by walking -   Reports stable on echo - to review if able  Had neg cxr at [place]    #End-stage renal disease  Long history of chronic kidney disease hemodialysis started 7/21 6/22 BP was high during end-stage renal disease 3 weeks ago BP now better thought not related to fluid  9/22 stable fluid status ongoing evaluation for transplant  3/23 now listed  9.23- continues to be listed- - at dry weight - 3 days a week   3.24- to list at [place]   10/24- to list at [facility] as well     #Obstructive sleep apnea  Negative study 2018 at [facility]   Suspected with severe desaturation in hospital at night ongoing fatigue and awakenings  1/22 study with AHI of 16 REM 46 supine 120 with lateral AHI of 16 ekaterina of 82% for titration  6/22 started CPAP 3/22 doing very well great usage AHI 0.1 noticeably benefiting  9/22 download with AHI 0.1 though continues to struggle with time-some improvement ongoing  Plan to check overnight to assure adequate oxygenation.  3.28.23 ahi 0.0!- doing well with machine-    Seeing counselor now for covid /family unwilling to get vaccinated  To recheck overnight-- in NOv with 70min below 88%   9.23- doing great - dubeck-- to get overnight to assure adequate oxygenation given the pulmonary hypertension- feels rested on some days   3.24- cpap doing very well- ahi 0.2 with  great usage   10/24 - great - ahi 0.3 and greatly benefiting     #Hypertension with diastolic dysfunction  Pulmonary hypertension as above fluid status controlled and states blood pressure has been improved  Remains improved   9.23- remains stable - up and down   10/24- remains stable - up and down and tritrating hydralazine and multiple meds for hypertension    #Diabetes  Following    # Chronic sinusitis-- for polyp ectomy and told CT with very chronic sinusitis thought for the past 20 years-interested in pursuing surgery as recommended by [facility] [place] [facility] - and for sinus surgery -    10/24 did so great with sinus surgery - left side- so much better -- remains on steroid rinse     -Plan:  -continue good work with machine   - continue  checking oxygen sats with walking at times -   - see me in 6 months     Brionna Harris MD  Pulmonary Medicine  10.3.24

## 2024-11-19 ENCOUNTER — APPOINTMENT (OUTPATIENT)
Dept: PODIATRY | Age: 69
End: 2024-11-19

## 2024-11-28 PROCEDURE — 90960 ESRD SRV 4 VISITS P MO 20+: CPT | Performed by: INTERNAL MEDICINE

## 2024-11-30 ENCOUNTER — OFF PREMISE (OUTPATIENT)
Dept: NEPHROLOGY | Age: 69
End: 2024-11-30

## 2024-11-30 DIAGNOSIS — N18.6 ESRD (END STAGE RENAL DISEASE) ON DIALYSIS  (CMD): Primary | ICD-10-CM

## 2024-11-30 DIAGNOSIS — Z99.2 ESRD (END STAGE RENAL DISEASE) ON DIALYSIS  (CMD): Primary | ICD-10-CM

## 2024-12-14 ENCOUNTER — OFF PREMISE (OUTPATIENT)
Dept: HEALTH INFORMATION MANAGEMENT | Facility: OTHER | Age: 69
End: 2024-12-14

## 2024-12-31 ENCOUNTER — OFF PREMISE (OUTPATIENT)
Dept: OTHER | Age: 69
End: 2024-12-31

## 2025-01-31 ENCOUNTER — OFF PREMISE (OUTPATIENT)
Dept: OTHER | Age: 70
End: 2025-01-31

## 2025-02-03 ENCOUNTER — APPOINTMENT (OUTPATIENT)
Dept: PODIATRY | Age: 70
End: 2025-02-03

## 2025-02-03 DIAGNOSIS — L60.3 NAIL DYSTROPHY: ICD-10-CM

## 2025-02-03 DIAGNOSIS — E11.42 TYPE 2 DIABETES MELLITUS WITH DIABETIC POLYNEUROPATHY, WITHOUT LONG-TERM CURRENT USE OF INSULIN (CMD): Primary | ICD-10-CM

## 2025-02-03 DIAGNOSIS — E11.22 TYPE 2 DIABETES MELLITUS WITH CHRONIC KIDNEY DISEASE ON CHRONIC DIALYSIS, WITHOUT LONG-TERM CURRENT USE OF INSULIN  (CMD): ICD-10-CM

## 2025-02-03 DIAGNOSIS — Z99.2 TYPE 2 DIABETES MELLITUS WITH CHRONIC KIDNEY DISEASE ON CHRONIC DIALYSIS, WITHOUT LONG-TERM CURRENT USE OF INSULIN  (CMD): ICD-10-CM

## 2025-02-03 DIAGNOSIS — N18.6 TYPE 2 DIABETES MELLITUS WITH CHRONIC KIDNEY DISEASE ON CHRONIC DIALYSIS, WITHOUT LONG-TERM CURRENT USE OF INSULIN  (CMD): ICD-10-CM

## 2025-02-03 PROCEDURE — 99213 OFFICE O/P EST LOW 20 MIN: CPT | Performed by: PODIATRIST

## 2025-02-28 ENCOUNTER — OFF PREMISE (OUTPATIENT)
Dept: OTHER | Age: 70
End: 2025-02-28

## 2025-03-31 ENCOUNTER — OFF PREMISE (OUTPATIENT)
Dept: OTHER | Age: 70
End: 2025-03-31

## 2025-04-30 ENCOUNTER — OFF PREMISE (OUTPATIENT)
Dept: OTHER | Age: 70
End: 2025-04-30

## 2025-05-06 ENCOUNTER — APPOINTMENT (OUTPATIENT)
Dept: PODIATRY | Age: 70
End: 2025-05-06

## 2025-05-06 DIAGNOSIS — Z99.2 TYPE 2 DIABETES MELLITUS WITH CHRONIC KIDNEY DISEASE ON CHRONIC DIALYSIS, WITHOUT LONG-TERM CURRENT USE OF INSULIN  (CMD): ICD-10-CM

## 2025-05-06 DIAGNOSIS — E11.22 TYPE 2 DIABETES MELLITUS WITH CHRONIC KIDNEY DISEASE ON CHRONIC DIALYSIS, WITHOUT LONG-TERM CURRENT USE OF INSULIN  (CMD): ICD-10-CM

## 2025-05-06 DIAGNOSIS — E11.42 TYPE 2 DIABETES MELLITUS WITH DIABETIC POLYNEUROPATHY, WITHOUT LONG-TERM CURRENT USE OF INSULIN (CMD): Primary | ICD-10-CM

## 2025-05-06 DIAGNOSIS — L60.3 NAIL DYSTROPHY: ICD-10-CM

## 2025-05-06 DIAGNOSIS — N18.6 TYPE 2 DIABETES MELLITUS WITH CHRONIC KIDNEY DISEASE ON CHRONIC DIALYSIS, WITHOUT LONG-TERM CURRENT USE OF INSULIN  (CMD): ICD-10-CM

## 2025-05-06 PROCEDURE — 99213 OFFICE O/P EST LOW 20 MIN: CPT | Performed by: PODIATRIST

## 2025-05-08 ENCOUNTER — TELEPHONE (OUTPATIENT)
Dept: PODIATRY | Age: 70
End: 2025-05-08

## 2025-05-29 ENCOUNTER — APPOINTMENT (OUTPATIENT)
Dept: PODIATRY | Age: 70
End: 2025-05-29

## 2025-05-29 DIAGNOSIS — E11.42 TYPE 2 DIABETES MELLITUS WITH DIABETIC POLYNEUROPATHY, WITHOUT LONG-TERM CURRENT USE OF INSULIN (CMD): Primary | ICD-10-CM

## 2025-05-29 DIAGNOSIS — G57.93 NEUROPATHIC PAIN OF BOTH LEGS: ICD-10-CM

## 2025-05-29 RX ORDER — POLYETHYLENE GLYCOL-3350 AND ELECTROLYTES 236; 6.74; 5.86; 2.97; 22.74 G/274.31G; G/274.31G; G/274.31G; G/274.31G; G/274.31G
POWDER, FOR SOLUTION ORAL
COMMUNITY
Start: 2025-05-06

## 2025-05-29 RX ORDER — CALCITRIOL 0.25 UG/1
0.25 CAPSULE, LIQUID FILLED ORAL DAILY
COMMUNITY

## 2025-05-31 ENCOUNTER — OFF PREMISE (OUTPATIENT)
Dept: NEPHROLOGY | Age: 70
End: 2025-05-31

## 2025-05-31 ENCOUNTER — OFF PREMISE (OUTPATIENT)
Dept: OTHER | Age: 70
End: 2025-05-31

## 2025-05-31 DIAGNOSIS — Z99.2 ESRD (END STAGE RENAL DISEASE) ON DIALYSIS  (CMD): Primary | ICD-10-CM

## 2025-05-31 DIAGNOSIS — N18.6 ESRD (END STAGE RENAL DISEASE) ON DIALYSIS  (CMD): Primary | ICD-10-CM

## 2025-06-06 ENCOUNTER — OFF PREMISE (OUTPATIENT)
Dept: NEPHROLOGY | Age: 70
End: 2025-06-06

## 2025-06-06 DIAGNOSIS — N18.6 ESRD (END STAGE RENAL DISEASE) ON DIALYSIS  (CMD): Primary | ICD-10-CM

## 2025-06-06 DIAGNOSIS — Z99.2 ESRD (END STAGE RENAL DISEASE) ON DIALYSIS  (CMD): Primary | ICD-10-CM

## 2025-06-09 ENCOUNTER — TELEPHONE (OUTPATIENT)
Dept: PODIATRY | Age: 70
End: 2025-06-09

## 2025-06-26 ENCOUNTER — TELEPHONE (OUTPATIENT)
Dept: PODIATRY | Age: 70
End: 2025-06-26

## 2025-07-31 ENCOUNTER — OFF PREMISE (OUTPATIENT)
Dept: OTHER | Age: 70
End: 2025-07-31

## 2025-08-05 ENCOUNTER — APPOINTMENT (OUTPATIENT)
Dept: PODIATRY | Age: 70
End: 2025-08-05

## 2025-08-05 DIAGNOSIS — L60.3 NAIL DYSTROPHY: ICD-10-CM

## 2025-08-05 DIAGNOSIS — E11.42 TYPE 2 DIABETES MELLITUS WITH DIABETIC POLYNEUROPATHY, WITHOUT LONG-TERM CURRENT USE OF INSULIN (CMD): Primary | ICD-10-CM

## 2025-08-05 DIAGNOSIS — N18.6 TYPE 2 DIABETES MELLITUS WITH CHRONIC KIDNEY DISEASE ON CHRONIC DIALYSIS, WITHOUT LONG-TERM CURRENT USE OF INSULIN  (CMD): ICD-10-CM

## 2025-08-05 DIAGNOSIS — Z99.2 TYPE 2 DIABETES MELLITUS WITH CHRONIC KIDNEY DISEASE ON CHRONIC DIALYSIS, WITHOUT LONG-TERM CURRENT USE OF INSULIN  (CMD): ICD-10-CM

## 2025-08-05 DIAGNOSIS — E11.22 TYPE 2 DIABETES MELLITUS WITH CHRONIC KIDNEY DISEASE ON CHRONIC DIALYSIS, WITHOUT LONG-TERM CURRENT USE OF INSULIN  (CMD): ICD-10-CM

## 2025-08-05 PROCEDURE — 99213 OFFICE O/P EST LOW 20 MIN: CPT | Performed by: PODIATRIST

## 2025-08-28 ENCOUNTER — PATIENT MESSAGE (OUTPATIENT)
Facility: CLINIC | Age: 70
End: 2025-08-28

## 2025-08-28 ENCOUNTER — OFFICE VISIT (OUTPATIENT)
Facility: CLINIC | Age: 70
End: 2025-08-28

## 2025-08-28 VITALS
BODY MASS INDEX: 33.29 KG/M2 | HEART RATE: 65 BPM | RESPIRATION RATE: 16 BRPM | SYSTOLIC BLOOD PRESSURE: 128 MMHG | DIASTOLIC BLOOD PRESSURE: 70 MMHG | WEIGHT: 195 LBS | HEIGHT: 64 IN | OXYGEN SATURATION: 98 %

## 2025-08-28 DIAGNOSIS — G47.33 OSA (OBSTRUCTIVE SLEEP APNEA): ICD-10-CM

## 2025-08-28 DIAGNOSIS — I27.20 PULMONARY HYPERTENSION (HCC): ICD-10-CM

## 2025-08-28 DIAGNOSIS — Z01.811 PRE-OP CHEST EXAM: Primary | ICD-10-CM

## 2025-08-28 PROCEDURE — 99214 OFFICE O/P EST MOD 30 MIN: CPT | Performed by: INTERNAL MEDICINE

## 2025-08-28 RX ORDER — MINOXIDIL 2.5 MG/1
2.5 TABLET ORAL
COMMUNITY

## 2025-08-28 RX ORDER — MOMETASONE FUROATE MONOHYDRATE 50 UG/1
1 SPRAY, METERED NASAL
COMMUNITY
Start: 2024-10-24

## 2025-08-28 RX ORDER — ONDANSETRON 4 MG/1
4 TABLET, ORALLY DISINTEGRATING ORAL
COMMUNITY
Start: 2024-12-20

## 2025-08-28 RX ORDER — CALCITRIOL 0.25 UG/1
1 CAPSULE, LIQUID FILLED ORAL
COMMUNITY
Start: 2025-02-23

## 2025-09-18 ENCOUNTER — APPOINTMENT (OUTPATIENT)
Dept: UROLOGY | Age: 70
End: 2025-09-18

## 2025-11-04 ENCOUNTER — APPOINTMENT (OUTPATIENT)
Dept: PODIATRY | Age: 70
End: 2025-11-04

## (undated) DEVICE — LAWSON - CATH RADIAL JACKY 5F 3.5X100CM

## (undated) DEVICE — Device

## (undated) DEVICE — LAWSON - DEVICE VASCBAND RAD REG 24CM

## (undated) NOTE — LETTER
[facility] [address], [address]  Consent for Procedure/Sedation    Date: 07/16/2021    Time: 0841      1. I authorize the performance upon Belen Wan the following:  Insertion of Temporary Dialysis Catheter     2.  I authorize : 8/10/1955       Medical Record #: [id number]

## (undated) NOTE — LETTER
[facility]  Jodi Buchanan 62 [address]  Consent for Procedure/Sedation    Date:9/20/2021    Time:0800      1. I authorize the performance upon Kalyan Khanna the following:  Insertion Permanent Dialysis Catheter     2.  I authorize Dr. Bonifacio Patterson 8/10/1955       Medical Record #: [id number]

## (undated) NOTE — Clinical Note
Can you please get patient's echo results from [facility] in August of this year

## (undated) NOTE — LETTER
[facility] [address], [address]  Consent for Procedure/Sedation    Date: 7/16/2021   Time: 1000      1. I authorize the performance upon Geoff Ortiz the following:  Insertion Permanent Dialysis Catheter     2.  I authorize  8/10/1955       Medical Record #: [id number]